# Patient Record
Sex: FEMALE | Race: BLACK OR AFRICAN AMERICAN | Employment: FULL TIME | URBAN - METROPOLITAN AREA
[De-identification: names, ages, dates, MRNs, and addresses within clinical notes are randomized per-mention and may not be internally consistent; named-entity substitution may affect disease eponyms.]

---

## 2017-06-29 ENCOUNTER — OFFICE VISIT (OUTPATIENT)
Dept: FAMILY MEDICINE CLINIC | Age: 37
End: 2017-06-29

## 2017-06-29 VITALS
TEMPERATURE: 98.2 F | DIASTOLIC BLOOD PRESSURE: 90 MMHG | HEART RATE: 70 BPM | SYSTOLIC BLOOD PRESSURE: 130 MMHG | BODY MASS INDEX: 32.8 KG/M2 | HEIGHT: 67 IN | RESPIRATION RATE: 16 BRPM | WEIGHT: 209 LBS | OXYGEN SATURATION: 96 %

## 2017-06-29 DIAGNOSIS — J34.89 RHINORRHEA: ICD-10-CM

## 2017-06-29 DIAGNOSIS — M54.6 CHRONIC MIDLINE THORACIC BACK PAIN: Primary | ICD-10-CM

## 2017-06-29 DIAGNOSIS — L98.9 SKIN LESION OF HAND: ICD-10-CM

## 2017-06-29 DIAGNOSIS — G89.29 CHRONIC MIDLINE THORACIC BACK PAIN: Primary | ICD-10-CM

## 2017-06-29 RX ORDER — CHOLECALCIFEROL TAB 125 MCG (5000 UNIT) 125 MCG
TAB ORAL DAILY
COMMUNITY
End: 2019-01-07

## 2017-06-29 RX ORDER — CYCLOBENZAPRINE HCL 10 MG
TABLET ORAL
COMMUNITY
End: 2017-08-07

## 2017-06-29 RX ORDER — ATORVASTATIN CALCIUM 20 MG/1
20 TABLET, FILM COATED ORAL DAILY
COMMUNITY
End: 2017-09-13 | Stop reason: SDUPTHER

## 2017-06-29 NOTE — PROGRESS NOTES
HISTORY OF PRESENT ILLNESS  Samantha Sosa is a 44 y.o. female. HPI   Pt is new to the practice. Patient is here today for evaluation and treatment of: Back Pain / Headache / Skin Problem    Back Pain:   Pt has had some back pain; Pain is localized to the center mid back;; may radiate to sides. No injuries to back. Aleve may have helped some;  Pain comes and goes. She is approximately a 44 HH bra size; She wonders if this is contributory. She is not interested in breast reduction surgery at this time. Headache: awakened last night with a throbbing Headache. Took an aleve and sx got better . No change in vision. LMP 6/24/2017 . Had a similar HA 2 nights before. Skin Problem: has a bump on her right hand. Has been present X 1 month; No itch; No pain; No drainage; no weeping. Has rhinnorhea; may take a benadryl at times. No sneezing; No itchy or watery eyes ; No nasal congestion; No fever. BP is slightly elevated today. Had BP check before  giving blood last Thursday and BP was stable. Plans to start an exercise regimen. Current Outpatient Prescriptions:     cholecalciferol, VITAMIN D3, (VITAMIN D3) 5,000 unit tab tablet, Take  by mouth daily. , Disp: , Rfl:     atorvastatin (LIPITOR) 20 mg tablet, Take 20 mg by mouth daily. , Disp: , Rfl:     cyclobenzaprine (FLEXERIL) 10 mg tablet, Take  by mouth three (3) times daily as needed for Muscle Spasm(s). , Disp: , Rfl:       PMH,  Meds, Allergies, Family History, Social history reviewed    Review of Systems   Constitutional: Negative for chills, fever and malaise/fatigue. Cardiovascular: Negative for chest pain and palpitations. Musculoskeletal: Positive for back pain. Negative for joint pain. Skin: Negative for rash. Physical Exam   Constitutional: She is oriented to person, place, and time. She appears well-developed and well-nourished. No distress. HENT:   Head: Normocephalic and atraumatic.    Right Ear: Tympanic membrane normal.   Left Ear: Tympanic membrane normal.   Nose: No mucosal edema or rhinorrhea. Mouth/Throat: No oropharyngeal exudate. Neck: Neck supple. No thyromegaly present. Cardiovascular: Normal rate and regular rhythm. Exam reveals no gallop and no friction rub. No murmur heard. Pulmonary/Chest: Breath sounds normal. No respiratory distress. She has no wheezes. She has no rales. Musculoskeletal: She exhibits tenderness (in mid central back). She exhibits no edema. Neurological: She is alert and oriented to person, place, and time. No cranial nerve deficit. She exhibits normal muscle tone. Coordination normal.   Nursing note and vitals reviewed. right dorsal hand with a pinpoint flesh tone papule  Visit Vitals    BP (!) 134/96 (BP 1 Location: Right arm, BP Patient Position: Sitting)    Pulse 70    Temp 98.2 °F (36.8 °C) (Oral)    Resp 16    Ht 5' 7\" (1.702 m)    Wt 209 lb (94.8 kg)    LMP 06/23/2017    SpO2 96%    BMI 32.73 kg/m2         ASSESSMENT and PLAN    ICD-10-CM ICD-9-CM    1. Chronic midline thoracic back pain- new likely mechanical M54.6 724.1 REFERRAL TO PHYSICAL THERAPY    G89.29 338.29    2. Skin lesion of hand- ? wart L98.9 709.9    3. Rhinorrhea - probable allergic rhinitis J34.89 478.19        As above, all new to this provider   treatment plan as listed below  Orders Placed This Encounter    REFERRAL TO PHYSICAL THERAPY    cholecalciferol, VITAMIN D3, (VITAMIN D3) 5,000 unit tab tablet    atorvastatin (LIPITOR) 20 mg tablet    cyclobenzaprine (FLEXERIL) 10 mg tablet     meds reconciled  PT consult  Advised OTC claritin for rhinnorhea  Consider salicylic acid to lesion on hand; ? Wart  Follow-up Disposition:  Return in about 8 weeks (around 8/24/2017) for back pain. An After Visit Summary was printed and given to the patient. This has been fully explained to the patient, who indicates understanding.   Upper back pain exercises given  Monitor BP; exercise regimen will likely help BP control

## 2017-06-29 NOTE — PATIENT INSTRUCTIONS
Healthy Upper Back: Exercises  Your Care Instructions  Here are some examples of exercises for your upper back. Start each exercise slowly. Ease off the exercise if you start to have pain. Your doctor or physical therapist will tell you when you can start these exercises and which ones will work best for you. How to do the exercises  Lower neck and upper back stretch    1. Stretch your arms out in front of your body. Clasp one hand on top of your other hand. 2. Gently reach out so that you feel your shoulder blades stretching away from each other. 3. Gently bend your head forward. 4. Hold for 15 to 30 seconds. 5. Repeat 2 to 4 times. Midback stretch    Note: If you have knee pain, do not do this exercise. 1. Kneel on the floor, and sit back on your ankles. 2. Lean forward, place your hands on the floor, and stretch your arms out in front of you. Rest your head between your arms. 3. Gently push your chest toward the floor, reaching as far in front of you as possible. 4. Hold for 15 to 30 seconds. 5. Repeat 2 to 4 times. Shoulder rolls    1. Sit comfortably with your feet shoulder-width apart. You can also do this exercise while standing. 2. Roll your shoulders up, then back, and then down in a smooth, circular motion. 3. Repeat 2 to 4 times. Wall push-up    1. Stand against a wall with your feet about 12 to 24 inches back from the wall. If you feel any pain when you do this exercise, stand closer to the wall. 2. Place your hands on the wall slightly wider apart than your shoulders, and lean forward. 3. Gently lean your body toward the wall. Then push back to your starting position. Keep the motion smooth and controlled. 4. Repeat 8 to 12 times. Resisted shoulder blade squeeze    Note: For this exercise, you will need elastic exercise material, such as surgical tubing or Thera-Band. 1. Sit or stand, holding the band in both hands in front of you.  Keep your elbows close to your sides, bent at a 90-degree angle. Your palms should face up. 2. Squeeze your shoulder blades together, and move your arms to the outside, stretching the band. Be sure to keep your elbows at your sides while you do this. 3. Relax. 4. Repeat 8 to 12 times. Resisted rows    Note: For this exercise, you will need elastic exercise material, such as surgical tubing or Thera-Band. 1. Put the band around a solid object, such as a bedpost, at about waist level. Hold one end of the band in each hand. 2. With your elbows at your sides and bent to 90 degrees, pull the band back to move your shoulder blades toward each other. Return to the starting position. 3. Repeat 8 to 12 times. Follow-up care is a key part of your treatment and safety. Be sure to make and go to all appointments, and call your doctor if you are having problems. It's also a good idea to know your test results and keep a list of the medicines you take. Where can you learn more? Go to http://genaro-renu.info/. Enter N176 in the search box to learn more about \"Healthy Upper Back: Exercises. \"  Current as of: March 21, 2017  Content Version: 11.3  © 9286-9176 [employer]. Care instructions adapted under license by [employer] (which disclaims liability or warranty for this information). If you have questions about a medical condition or this instruction, always ask your healthcare professional. [place] [address] any warranty or liability for your use of this information.

## 2017-06-29 NOTE — MR AVS SNAPSHOT
Visit Information Date & Time Provider Department Dept. Phone Encounter #  
 6/29/2017  3:20 PM Edison Velasquez, [address] [id number] Follow-up Instructions Return in about 8 weeks (around 8/24/2017) for back pain. Upcoming Health Maintenance Date Due DTaP/Tdap/Td series (1 - Tdap) 2/21/2000 PAP AKA CERVICAL CYTOLOGY 2/21/2000 INFLUENZA AGE 9 TO ADULT 8/1/2017 Allergies as of 6/29/2017  Review Complete On: 6/29/2017 By: Jenn Saldivar LPN No Known Allergies Current Immunizations  Never Reviewed No immunizations on file. Not reviewed this visit You Were Diagnosed With   
  
 Codes Comments Chronic midline thoracic back pain    -  Primary ICD-10-CM: M54.6, G89.29 ICD-9-CM: 724.1, 338.29 Skin lesion of hand     ICD-10-CM: L98.9 ICD-9-CM: 709.9 Rhinorrhea     ICD-10-CM: J34.89 ICD-9-CM: 478.19 Vitals BP Pulse Temp Resp Height(growth percentile) Weight(growth percentile) 130/90 70 98.2 °F (36.8 °C) (Oral) 16 5' 7\" (1.702 m) 209 lb (94.8 kg) LMP SpO2 BMI OB Status Smoking Status 06/23/2017 96% 32.73 kg/m2 Unknown Never Smoker Vitals History BMI and BSA Data Body Mass Index Body Surface Area 32.73 kg/m 2 2.12 m 2 Your Updated Medication List  
  
   
This list is accurate as of: 6/29/17  4:13 PM.  Always use your most recent med list.  
  
  
  
  
 atorvastatin 20 mg tablet Commonly known as:  LIPITOR Take 20 mg by mouth daily. cyclobenzaprine 10 mg tablet Commonly known as:  FLEXERIL Take  by mouth three (3) times daily as needed for Muscle Spasm(s). VITAMIN D3 5,000 unit Tab tablet Generic drug:  cholecalciferol (VITAMIN D3) Take  by mouth daily. We Performed the Following REFERRAL TO PHYSICAL THERAPY [SZD29 Custom] Comments:  
 Please evaluate patient for thoracic back pain. Please schedule and authorize patient for services per therapists' discretion Follow-up Instructions Return in about 8 weeks (around 8/24/2017) for back pain. Referral Information Referral ID Referred By Referred To  
  
 [id number] Octavio Dhillon Not Available Visits Status Start Date End Date 1 New Request 6/29/17 6/29/18 If your referral has a status of pending review or denied, additional information will be sent to support the outcome of this decision. Patient Instructions Healthy Upper Back: Exercises Your Care Instructions Here are some examples of exercises for your upper back. Start each exercise slowly. Ease off the exercise if you start to have pain. Your doctor or physical therapist will tell you when you can start these exercises and which ones will work best for you. How to do the exercises Lower neck and upper back stretch 1. Stretch your arms out in front of your body. Clasp one hand on top of your other hand. 2. Gently reach out so that you feel your shoulder blades stretching away from each other. 3. Gently bend your head forward. 4. Hold for 15 to 30 seconds. 5. Repeat 2 to 4 times. Midback stretch Note: If you have knee pain, do not do this exercise. 1. Kneel on the floor, and sit back on your ankles. 2. Lean forward, place your hands on the floor, and stretch your arms out in front of you. Rest your head between your arms. 3. Gently push your chest toward the floor, reaching as far in front of you as possible. 4. Hold for 15 to 30 seconds. 5. Repeat 2 to 4 times. Shoulder rolls 1. Sit comfortably with your feet shoulder-width apart. You can also do this exercise while standing. 2. Roll your shoulders up, then back, and then down in a smooth, circular motion. 3. Repeat 2 to 4 times. Wall push-up 1.  Stand against a wall with your feet about 12 to 24 inches back from the wall. If you feel any pain when you do this exercise, stand closer to the wall. 2. Place your hands on the wall slightly wider apart than your shoulders, and lean forward. 3. Gently lean your body toward the wall. Then push back to your starting position. Keep the motion smooth and controlled. 4. Repeat 8 to 12 times. Resisted shoulder blade squeeze Note: For this exercise, you will need elastic exercise material, such as surgical tubing or Thera-Band. 1. Sit or stand, holding the band in both hands in front of you. Keep your elbows close to your sides, bent at a 90-degree angle. Your palms should face up. 2. Squeeze your shoulder blades together, and move your arms to the outside, stretching the band. Be sure to keep your elbows at your sides while you do this. 3. Relax. 4. Repeat 8 to 12 times. Resisted rows Note: For this exercise, you will need elastic exercise material, such as surgical tubing or Thera-Band. 1. Put the band around a solid object, such as a bedpost, at about waist level. Hold one end of the band in each hand. 2. With your elbows at your sides and bent to 90 degrees, pull the band back to move your shoulder blades toward each other. Return to the starting position. 3. Repeat 8 to 12 times. Follow-up care is a key part of your treatment and safety. Be sure to make and go to all appointments, and call your doctor if you are having problems. It's also a good idea to know your test results and keep a list of the medicines you take. Where can you learn more? Go to http://genaro-renu.info/. Enter C798 in the search box to learn more about \"Healthy Upper Back: Exercises. \" Current as of: March 21, 2017 Content Version: 11.3 © 0636-7232 [employer]. Care instructions adapted under license by [employer] (which disclaims liability or warranty for this information).  If you have questions about a medical condition or this instruction, always ask your healthcare professional. [facility] [address] any warranty or liability for your use of this information. Introducing [facility]! Edgard Deleon introduces [employer] patient portal. Now you can access parts of your medical record, email your doctor's office, and request medication refills online. 1. In your internet browser, go to [email or web address] 2. Click on the First Time User? Click Here link in the Sign In box. You will see the New Member Sign Up page. 3. Enter your [employer] Access Code exactly as it appears below. You will not need to use this code after youve completed the sign-up process. If you do not sign up before the expiration date, you must request a new code. · [employer] Access Code: [email or web address] Expires: 9/27/2017  4:12 PM 
 
4. Enter the last four digits of your Social Security Number (xxxx) and Date of Birth (mm/dd/yyyy) as indicated and click Submit. You will be taken to the next sign-up page. 5. Create a [employer] ID. This will be your [employer] login ID and cannot be changed, so think of one that is secure and easy to remember. 6. Create a [employer] password. You can change your password at any time. 7. Enter your Password Reset Question and Answer. This can be used at a later time if you forget your password. 8. Enter your e-mail address. You will receive e-mail notification when new information is available in [address] 9. Click Sign Up. You can now view and download portions of your medical record. 10. Click the Download Summary menu link to download a portable copy of your medical information. If you have questions, please visit the Frequently Asked Questions section of the [employer] website. Remember, [employer] is NOT to be used for urgent needs. For medical emergencies, dial 911. Now available from your iPhone and Android! Please provide this summary of care documentation to your next provider. If you have any questions after today's visit, please call [phone].

## 2017-08-07 ENCOUNTER — HOSPITAL ENCOUNTER (EMERGENCY)
Age: 37
Discharge: HOME | End: 2017-08-07
Attending: EMERGENCY MEDICINE
Payer: COMMERCIAL

## 2017-08-07 VITALS
WEIGHT: 212 LBS | BODY MASS INDEX: 33.27 KG/M2 | SYSTOLIC BLOOD PRESSURE: 157 MMHG | HEART RATE: 73 BPM | RESPIRATION RATE: 18 BRPM | HEIGHT: 67 IN | OXYGEN SATURATION: 100 % | TEMPERATURE: 99.1 F | DIASTOLIC BLOOD PRESSURE: 95 MMHG

## 2017-08-07 DIAGNOSIS — M54.6 CHRONIC THORACIC BACK PAIN, UNSPECIFIED BACK PAIN LATERALITY: Primary | ICD-10-CM

## 2017-08-07 DIAGNOSIS — G89.29 CHRONIC THORACIC BACK PAIN, UNSPECIFIED BACK PAIN LATERALITY: Primary | ICD-10-CM

## 2017-08-07 DIAGNOSIS — R03.0 ELEVATED BLOOD PRESSURE READING: ICD-10-CM

## 2017-08-07 LAB — TROPONIN I BLD-MCNC: <0.04 NG/ML (ref 0–0.08)

## 2017-08-07 PROCEDURE — 84484 ASSAY OF TROPONIN QUANT: CPT

## 2017-08-07 PROCEDURE — 93005 ELECTROCARDIOGRAM TRACING: CPT

## 2017-08-07 PROCEDURE — 99283 EMERGENCY DEPT VISIT LOW MDM: CPT

## 2017-08-07 NOTE — ED TRIAGE NOTES
Pt. Complains of back pain on and off, she reports the back pain ongoing since the beginning of the year, she reports seeing her primary care since then. Pt denies any trauma.

## 2017-08-07 NOTE — ED PROVIDER NOTES
HPI Comments: 7:23 PM   44 y.o. female presents to ED C/O back pain. Patient has a HX of hypercholesteremia. Patient reports 6 months of intermittent back pain. patient states intermittent back pain, starts in the middle of her desk and the pain intermittently radiates side to side. Patient has been seen by PCP for same compliant in the past and she is following up with PT tomorrow as referred by PCP. Patient's PCP sent her home with prednisone and flexeril, patient did not take flexeril often. Patient reports left arm was aching, felt like she had sprained it and she had some neck pain. Patient concerned cardiac component of pain. Patient reports intermittent aching in the anterior chest, but she reports it may also be her breast that is hurting. Patient is a nonsmoker. LMP 7/21. Patient has no personal cardiac HX, no significant family cardiac HX. Patient denies back pain at this time. Patient denies any other symptoms or complaints. The history is provided by the patient. History limited by: No language barrier. Past Medical History:   Diagnosis Date    Hypercholesteremia     Ill-defined condition     High cholesterol. History reviewed. No pertinent surgical history. Family History:   Problem Relation Age of Onset    Hypertension Mother     Diabetes Maternal Grandmother      sarcoidosis    Prostate Cancer Maternal Grandfather        Social History     Social History    Marital status: SINGLE     Spouse name: N/A    Number of children: N/A    Years of education: N/A     Occupational History          Social History Main Topics    Smoking status: Never Smoker    Smokeless tobacco: Never Used    Alcohol use Yes      Comment: socially    Drug use: No    Sexual activity: No     Other Topics Concern    Not on file     Social History Narrative         ALLERGIES: Review of patient's allergies indicates no known allergies.     Review of Systems Constitutional: Negative for appetite change and fever. HENT: Negative for congestion, rhinorrhea and sore throat. Respiratory: Negative for cough, shortness of breath and wheezing. Cardiovascular: Negative for leg swelling. Gastrointestinal: Negative for abdominal pain, constipation, diarrhea, nausea and vomiting. Genitourinary: Negative for dysuria. Musculoskeletal: Positive for back pain. Negative for arthralgias. Neurological: Negative for dizziness, syncope and headaches. Vitals:    08/07/17 1856 08/07/17 1919   BP: (!) 157/95    Pulse: 73    Resp: 18    Temp: 99.1 °F (37.3 °C)    SpO2: 100% 100%   Weight: 96.2 kg (212 lb)    Height: 5' 7\" (1.702 m)             Physical Exam   Constitutional: She is oriented to person, place, and time. She appears well-developed and well-nourished. No distress. HENT:   Head: Normocephalic and atraumatic. Right Ear: External ear normal.   Left Ear: External ear normal.   Mouth/Throat: Oropharynx is clear and moist.   Eyes: Conjunctivae and EOM are normal.   Cardiovascular: Normal rate, regular rhythm and normal heart sounds. Pulmonary/Chest: Effort normal and breath sounds normal. No respiratory distress. She has no wheezes. She has no rales. She exhibits no tenderness. Musculoskeletal: Normal range of motion. Thoracic back: She exhibits normal range of motion, no tenderness, no bony tenderness, no swelling, no pain and normal pulse. Back:    Neurological: She is alert and oriented to person, place, and time. Coordination normal.   Skin: Skin is warm and dry. No rash noted. She is not diaphoretic. No erythema. No pallor. Nursing note and vitals reviewed.        MDM  Number of Diagnoses or Management Options  Chronic thoracic back pain, unspecified back pain laterality:   Elevated blood pressure reading:   Diagnosis management comments: Differential Diagnosis: Musculoskeletal pain, myofascial strain/sprain, muscle spasm, spondylolisthesis, spondylosis, DJD, OA, sciatica, aortic aneurysm, vertebral infection, renal colic, pyelonephritis, epidural abscess, epidural hematoma, herniated nucleus pulposis, malignancy    Plan:  EKG and Trop, low clinical concern for cardiac component, however due to age, and complaint of right arm involvement 3 days ago will get trop and EKG. Patient has no trauma and no pain at this time, no indication for imagining   Progress - EKG- Normal sinus rhythm Normal ECG No previous ECGs available, negative Trop  -Patient informed of results, educated to take home pain medication as needed. Patient referred to PCP for further evaluation, and orthopedics, also educated to keep PT appointment tomorrow for chronic thoracic back pain , referred by PCP. Patient educated to return to the ED for any new or worsening symptoms. Questions denied. Amount and/or Complexity of Data Reviewed  Clinical lab tests: ordered and reviewed      ED Course       Procedures             RESULTS:    No orders to display       Labs Reviewed   POC TROPONIN-I       Recent Results (from the past 12 hour(s))   EKG, 12 LEAD, INITIAL    Collection Time: 08/07/17  7:44 PM   Result Value Ref Range    Ventricular Rate 70 BPM    Atrial Rate 70 BPM    P-R Interval 164 ms    QRS Duration 82 ms    Q-T Interval 408 ms    QTC Calculation (Bezet) 440 ms    Calculated P Axis 41 degrees    Calculated R Axis 12 degrees    Calculated T Axis 34 degrees    Diagnosis       Normal sinus rhythm  Normal ECG  No previous ECGs available     POC TROPONIN-I    Collection Time: 08/07/17  8:00 PM   Result Value Ref Range    Troponin-I (POC) <0.04 0.00 - 0.08 ng/mL       PROGRESS NOTE:   7:23 PM   Initial assessment completed. Written by Roshan Clemente NP-C    One or more blood pressure readings were noted elevated during the Pt's presentation in the emergency department this date.   This abnormal reading has been cited in the Pt's diagnosis, and they have been encouraged to follow up with their primary care physician, or referred to a consultant for further evaluation and treatment. DISCHARGE NOTE:  9:07 PM   Jamila Delong's  results have been reviewed with her. She has been counseled regarding her diagnosis, treatment, and plan. She verbally conveys understanding and agreement of the signs, symptoms, diagnosis, treatment and prognosis and additionally agrees to follow up as discussed. She also agrees with the care-plan and conveys that all of her questions have been answered. I have also provided discharge instructions for her that include: educational information regarding their diagnosis and treatment, and list of reasons why they would want to return to the ED prior to their follow-up appointment, should her condition change. CLINICAL IMPRESSION:    1. Chronic thoracic back pain, unspecified back pain laterality    2.  Elevated blood pressure reading        AFTER VISIT PLAN:    Current Discharge Medication List           Follow-up Information     Follow up With Details Comments Contact Info    Anand Mccollum MD Schedule an appointment as soon as possible for a visit in 3 days Further evaluation [address] [facility]  [phone]             Written by Alexandra PATRICK              .

## 2017-08-08 ENCOUNTER — APPOINTMENT (OUTPATIENT)
Dept: PHYSICAL THERAPY | Age: 37
End: 2017-08-08

## 2017-08-08 LAB
ATRIAL RATE: 70 BPM
CALCULATED P AXIS, ECG09: 41 DEGREES
CALCULATED R AXIS, ECG10: 12 DEGREES
CALCULATED T AXIS, ECG11: 34 DEGREES
DIAGNOSIS, 93000: NORMAL
P-R INTERVAL, ECG05: 164 MS
Q-T INTERVAL, ECG07: 408 MS
QRS DURATION, ECG06: 82 MS
QTC CALCULATION (BEZET), ECG08: 440 MS
VENTRICULAR RATE, ECG03: 70 BPM

## 2017-08-08 NOTE — DISCHARGE INSTRUCTIONS
Back Pain: Care Instructions  Your Care Instructions    Back pain has many possible causes. It is often related to problems with muscles and ligaments of the back. It may also be related to problems with the nerves, discs, or bones of the back. Moving, lifting, standing, sitting, or sleeping in an awkward way can strain the back. Sometimes you don't notice the injury until later. Arthritis is another common cause of back pain. Although it may hurt a lot, back pain usually improves on its own within several weeks. Most people recover in 12 weeks or less. Using good home treatment and being careful not to stress your back can help you feel better sooner. Follow-up care is a key part of your treatment and safety. Be sure to make and go to all appointments, and call your doctor if you are having problems. Its also a good idea to know your test results and keep a list of the medicines you take. How can you care for yourself at home? · Sit or lie in positions that are most comfortable and reduce your pain. Try one of these positions when you lie down:  ¨ Lie on your back with your knees bent and supported by large pillows. ¨ Lie on the floor with your legs on the seat of a sofa or chair. Merrick Schiller on your side with your knees and hips bent and a pillow between your legs. ¨ Lie on your stomach if it does not make pain worse. · Do not sit up in bed, and avoid soft couches and twisted positions. Bed rest can help relieve pain at first, but it delays healing. Avoid bed rest after the first day of back pain. · Change positions every 30 minutes. If you must sit for long periods of time, take breaks from sitting. Get up and walk around, or lie in a comfortable position. · Try using a heating pad on a low or medium setting for 15 to 20 minutes every 2 or 3 hours. Try a warm shower in place of one session with the heating pad. · You can also try an ice pack for 10 to 15 minutes every 2 to 3 hours.  Put a thin cloth between the ice pack and your skin. · Take pain medicines exactly as directed. ¨ If the doctor gave you a prescription medicine for pain, take it as prescribed. ¨ If you are not taking a prescription pain medicine, ask your doctor if you can take an over-the-counter medicine. · Take short walks several times a day. You can start with 5 to 10 minutes, 3 or 4 times a day, and work up to longer walks. Walk on level surfaces and avoid hills and stairs until your back is better. · Return to work and other activities as soon as you can. Continued rest without activity is usually not good for your back. · To prevent future back pain, do exercises to stretch and strengthen your back and stomach. Learn how to use good posture, safe lifting techniques, and proper body mechanics. When should you call for help? Call your doctor now or seek immediate medical care if:  · You have new or worsening numbness in your legs. · You have new or worsening weakness in your legs. (This could make it hard to stand up.)  · You lose control of your bladder or bowels. Watch closely for changes in your health, and be sure to contact your doctor if:  · Your pain gets worse. · You are not getting better after 2 weeks. Where can you learn more? Go to http://genaro-renu.info/. Enter U901 in the search box to learn more about \"Back Pain: Care Instructions. \"  Current as of: March 21, 2017  Content Version: 11.3  © 5866-3715 [employer]ift[employer]. Care instructions adapted under license by Animated [employer] (which disclaims liability or warranty for this information). If you have questions about a medical condition or this instruction, always ask your healthcare professional. [address] any warranty or liability for your use of this information. Learning About Relief for Back Pain  What is back tension and strain?     Back strain happens when you overstretch, or pull, a muscle in your back. You may hurt your back in an accident or when you exercise or lift something. Most back pain will get better with rest and time. You can take care of yourself at home to help your back heal.  What can you do first to relieve back pain? When you first feel back pain, try these steps:  · Walk. Take a short walk (10 to 20 minutes) on a level surface (no slopes, hills, or stairs) every 2 to 3 hours. Walk only distances you can manage without pain, especially leg pain. · Relax. Find a comfortable position for rest. Some people are comfortable on the floor or a medium-firm bed with a small pillow under their head and another under their knees. Some people prefer to lie on their side with a pillow between their knees. Don't stay in one position for too long. · Try heat or ice. Try using a heating pad on a low or medium setting, or take a warm shower, for 15 to 20 minutes every 2 to 3 hours. Or you can buy single-use heat wraps that last up to 8 hours. You can also try an ice pack for 10 to 15 minutes every 2 to 3 hours. You can use an ice pack or a bag of frozen vegetables wrapped in a thin towel. There is not strong evidence that either heat or ice will help, but you can try them to see if they help. You may also want to try switching between heat and cold. · Take pain medicine exactly as directed. ¨ If the doctor gave you a prescription medicine for pain, take it as prescribed. ¨ If you are not taking a prescription pain medicine, ask your doctor if you can take an over-the-counter medicine. What else can you do? · Stretch and exercise. Exercises that increase flexibility may relieve your pain and make it easier for your muscles to keep your spine in a good, neutral position. And don't forget to keep walking. · Do self-massage. You can use self-massage to unwind after work or school or to energize yourself in the morning. You can easily massage your feet, hands, or neck.  Self-massage works best if you are in comfortable clothes and are sitting or lying in a comfortable position. Use oil or lotion to massage bare skin. · Reduce stress. Back pain can lead to a vicious [place]: Distress about the pain tenses the muscles in your back, which in turn causes more pain. Learn how to relax your mind and your muscles to lower your stress. Where can you learn more? Go to http://genaro-renu.info/. Enter Q009 in the search box to learn more about \"Learning About Relief for Back Pain. \"  Current as of: 2017  Content Version: 11.3  © 9614-1879 [employer]. Care instructions adapted under license by [employer] (which disclaims liability or warranty for this information). If you have questions about a medical condition or this instruction, always ask your healthcare professional. [address] any warranty or liability for your use of this information. [employer] Activation    Thank you for requesting access to [employer] Please follow the instructions below to securely access and download your online medical record. [employer] allows you to send messages to your doctor, view your test results, renew your prescriptions, schedule appointments, and more. How Do I Sign Up? 1. In your internet browser, go to [email or web address]  2. Click on the First Time User? Click Here link in the Sign In box. You will be redirect to the New Member Sign Up page. 3. Enter your [employer] Access Code exactly as it appears below. You will not need to use this code after youve completed the sign-up process. If you do not sign up before the expiration date, you must request a new code. [employer] Access Code: [email or web address]  Expires: 2017  4:12 PM (This is the date your [employer] access code will )    4. Enter the last four digits of your Social Security Number (xxxx) and Date of Birth (mm/dd/yyyy) as indicated and click Submit.  You will be taken to the next sign-up page. 5. Create a Harimatat ID. This will be your [employer]ebaTech login ID and cannot be changed, so think of one that is secure and easy to remember. 6. Create a [employer]Tech password. You can change your password at any time. 7. Enter your Password Reset Question and Answer. This can be used at a later time if you forget your password. 8. Enter your e-mail address. You will receive e-mail notification when new information is available in [address]. 9. Click Sign Up. You can now view and download portions of your medical record. 10. Click the Download Summary menu link to download a portable copy of your medical information. Additional Information    If you have questions, please visit the Frequently Asked Questions section of the [employer] website at [email or web address] Remember, [employer] is NOT to be used for urgent needs. For medical emergencies, dial 911.

## 2017-08-09 ENCOUNTER — OFFICE VISIT (OUTPATIENT)
Dept: FAMILY MEDICINE CLINIC | Age: 37
End: 2017-08-09

## 2017-08-09 ENCOUNTER — HOSPITAL ENCOUNTER (OUTPATIENT)
Dept: LAB | Age: 37
Discharge: HOME | End: 2017-08-09
Payer: COMMERCIAL

## 2017-08-09 VITALS
RESPIRATION RATE: 18 BRPM | TEMPERATURE: 98.5 F | OXYGEN SATURATION: 98 % | DIASTOLIC BLOOD PRESSURE: 86 MMHG | HEIGHT: 67 IN | HEART RATE: 89 BPM | WEIGHT: 213 LBS | SYSTOLIC BLOOD PRESSURE: 118 MMHG | BODY MASS INDEX: 33.43 KG/M2

## 2017-08-09 DIAGNOSIS — R42 DIZZINESS: ICD-10-CM

## 2017-08-09 DIAGNOSIS — M62.830 MUSCLE SPASM OF BACK: Primary | ICD-10-CM

## 2017-08-09 LAB
ALBUMIN SERPL BCP-MCNC: 3.7 G/DL (ref 3.4–5)
ALBUMIN/GLOB SERPL: 1.1 {RATIO} (ref 0.8–1.7)
ALP SERPL-CCNC: 60 U/L (ref 45–117)
ALT SERPL-CCNC: 17 U/L (ref 13–56)
ANION GAP BLD CALC-SCNC: 7 MMOL/L (ref 3–18)
AST SERPL W P-5'-P-CCNC: 12 U/L (ref 15–37)
BILIRUB SERPL-MCNC: 0.3 MG/DL (ref 0.2–1)
BUN SERPL-MCNC: 9 MG/DL (ref 7–18)
BUN/CREAT SERPL: 12 (ref 12–20)
CALCIUM SERPL-MCNC: 9 MG/DL (ref 8.5–10.1)
CHLORIDE SERPL-SCNC: 106 MMOL/L (ref 100–108)
CO2 SERPL-SCNC: 26 MMOL/L (ref 21–32)
CREAT SERPL-MCNC: 0.78 MG/DL (ref 0.6–1.3)
GLOBULIN SER CALC-MCNC: 3.4 G/DL (ref 2–4)
GLUCOSE SERPL-MCNC: 87 MG/DL (ref 74–99)
HCT VFR BLD AUTO: 37.1 % (ref 35–45)
HGB BLD-MCNC: 12.3 G/DL (ref 12–16)
POTASSIUM SERPL-SCNC: 4.4 MMOL/L (ref 3.5–5.5)
PROT SERPL-MCNC: 7.1 G/DL (ref 6.4–8.2)
SODIUM SERPL-SCNC: 139 MMOL/L (ref 136–145)
TSH SERPL DL<=0.05 MIU/L-ACNC: 0.91 UIU/ML (ref 0.36–3.74)

## 2017-08-09 PROCEDURE — 80053 COMPREHEN METABOLIC PANEL: CPT | Performed by: NURSE PRACTITIONER

## 2017-08-09 PROCEDURE — 84443 ASSAY THYROID STIM HORMONE: CPT | Performed by: NURSE PRACTITIONER

## 2017-08-09 PROCEDURE — 85018 HEMOGLOBIN: CPT | Performed by: NURSE PRACTITIONER

## 2017-08-09 RX ORDER — ORPHENADRINE CITRATE 100 MG/1
100 TABLET, EXTENDED RELEASE ORAL 2 TIMES DAILY
Qty: 30 TAB | Refills: 0 | Status: SHIPPED | OUTPATIENT
Start: 2017-08-09 | End: 2017-09-19 | Stop reason: SDUPTHER

## 2017-08-09 RX ORDER — NAPROXEN SODIUM 550 MG/1
550 TABLET ORAL 2 TIMES DAILY WITH MEALS
Qty: 30 TAB | Refills: 0 | Status: SHIPPED | OUTPATIENT
Start: 2017-08-09 | End: 2017-09-19 | Stop reason: SDUPTHER

## 2017-08-09 RX ORDER — CYCLOBENZAPRINE HCL 10 MG
TABLET ORAL
COMMUNITY
Start: 2017-06-05 | End: 2017-08-09 | Stop reason: ALTCHOICE

## 2017-08-09 NOTE — LETTER
8/10/2017 4:35 PM 
 
Ms. Memo Sanchez [address] [address] Dear Memo Sanchez: 
 
Please find your most recent results below. Resulted Orders HGB & HCT Result Value Ref Range HGB 12.3 12.0 - 16.0 g/dL HCT 37.1 35.0 - 45.0 % METABOLIC PANEL, COMPREHENSIVE Result Value Ref Range Sodium 139 136 - 145 mmol/L Potassium 4.4 3.5 - 5.5 mmol/L Chloride 106 100 - 108 mmol/L  
 CO2 26 21 - 32 mmol/L Anion gap 7 3.0 - 18 mmol/L Glucose 87 74 - 99 mg/dL BUN 9 7.0 - 18 MG/DL Creatinine 0.78 0.6 - 1.3 MG/DL  
 BUN/Creatinine ratio 12 12 - 20 GFR est AA >60 >60 ml/min/1.73m2 GFR est non-AA >60 >60 ml/min/1.73m2 Comment:  
   (NOTE) Estimated GFR is calculated using the Modification of Diet in Renal  
Disease (MDRD) Study equation, reported for both  [facility]) and non- Americans (GFRNA), and normalized to 1.73m2  
body surface area. The physician must decide which value applies to  
the patient. The MDRD study equation should only be used in  
individuals age 25 or older. It has not been validated for the  
following: pregnant women, patients with serious comorbid conditions,  
or on certain medications, or persons with extremes of body size,  
muscle mass, or nutritional status. Calcium 9.0 8.5 - 10.1 MG/DL Bilirubin, total 0.3 0.2 - 1.0 MG/DL  
 ALT (SGPT) 17 13 - 56 U/L  
 AST (SGOT) 12 (L) 15 - 37 U/L Alk. phosphatase 60 45 - 117 U/L Protein, total 7.1 6.4 - 8.2 g/dL Albumin 3.7 3.4 - 5.0 g/dL Globulin 3.4 2.0 - 4.0 g/dL A-G Ratio 1.1 0.8 - 1.7 TSH 3RD GENERATION Result Value Ref Range TSH 0.91 0.36 - 3.74 uIU/mL RECOMMENDATIONS: 
Your labs are within normal range. We will continue to monitor. Please call me if you have any questions: [phone] Sincerely, Anupama Kohli NP

## 2017-08-09 NOTE — PROGRESS NOTES
HISTORY OF PRESENT ILLNESS  Cathy Beebe is a 44 y.o. female.   HPI    ROS    Physical Exam    ASSESSMENT and PLAN  {ASSESSMENT/PLAN:29167}

## 2017-08-09 NOTE — MR AVS SNAPSHOT
Visit Information Date & Time Provider Department Dept. Phone Encounter #  
 8/9/2017  9:45 AM Danyel Rosario NP On license of [facility] [phone] [id number] Upcoming Health Maintenance Date Due DTaP/Tdap/Td series (1 - Tdap) 2/21/2000 PAP AKA CERVICAL CYTOLOGY 2/21/2000 INFLUENZA AGE 9 TO ADULT 8/1/2017 Allergies as of 8/9/2017  Review Complete On: 8/9/2017 By: Danyel Rosario NP No Known Allergies Current Immunizations  Never Reviewed No immunizations on file. Not reviewed this visit You Were Diagnosed With   
  
 Codes Comments Muscle spasm of back    -  Primary ICD-10-CM: U89.745 ICD-9-CM: 724.8 Dizziness     ICD-10-CM: S07 ICD-9-CM: 780.4 Vitals BP Pulse Temp Resp Height(growth percentile) Weight(growth percentile) 118/86 (BP 1 Location: Right arm, BP Patient Position: Sitting) 89 98.5 °F (36.9 °C) (Oral) 18 5' 7\" (1.702 m) 213 lb (96.6 kg) LMP SpO2 BMI OB Status Smoking Status 07/20/2017 98% 33.36 kg/m2 Having regular periods Never Smoker Vitals History BMI and BSA Data Body Mass Index Body Surface Area  
 33.36 kg/m 2 2.14 m 2 Preferred Pharmacy Pharmacy Name Phone Elie Hagan E [facility], [address] [place] [address] Road [phone] Your Updated Medication List  
  
   
This list is accurate as of: 8/9/17 10:15 AM.  Always use your most recent med list.  
  
  
  
  
 atorvastatin 20 mg tablet Commonly known as:  LIPITOR Take 20 mg by mouth daily. naproxen sodium 550 mg tablet Commonly known as:  Hoang Counts Take 1 Tab by mouth two (2) times daily (with meals). orphenadrine citrate 100 mg sr tablet Commonly known as:  NORFLEX Take 1 Tab by mouth two (2) times a day. GURPREET PO Take 1 Tab by mouth daily. VITAMIN D3 5,000 unit Tab tablet Generic drug:  cholecalciferol (VITAMIN D3) Take  by mouth daily. Prescriptions Sent to Pharmacy Refills  
 naproxen sodium (ANAPROX) 550 mg tablet 0 Sig: Take 1 Tab by mouth two (2) times daily (with meals). Class: Normal  
 Pharmacy: [facility] [address], [address] [place] [address] Road Ph #: [phone] Route: Oral  
 orphenadrine citrate (NORFLEX) 100 mg sr tablet 0 Sig: Take 1 Tab by mouth two (2) times a day. Class: Normal  
 Pharmacy: [facility] [address], [address] [place] [address] Ph #: [phone] Route: Oral  
  
To-Do List   
 08/09/2017 Lab:  HGB & HCT   
  
 08/09/2017 Lab:  METABOLIC PANEL, COMPREHENSIVE   
  
 08/09/2017 Lab:  TSH 3RD GENERATION   
  
 08/16/2017 2:00 PM  
  Appointment with Ralf Spears PT at [facility]  [facility] [address] ([phone]) Introducing [facility]! [facility] introduces exoro system patient portal. Now you can access parts of your medical record, email your doctor's office, and request medication refills online. 1. In your internet browser, go to [email or web address] 2. Click on the First Time User? Click Here link in the Sign In box. You will see the New Member Sign Up page. 3. Enter your exoro system Access Code exactly as it appears below. You will not need to use this code after youve completed the sign-up process. If you do not sign up before the expiration date, you must request a new code. · exoro system Access Code: [email or web address] Expires: 9/27/2017  4:12 PM 
 
4. Enter the last four digits of your Social Security Number (xxxx) and Date of Birth (mm/dd/yyyy) as indicated and click Submit. You will be taken to the next sign-up page. 5. Create a Waynat ID. This will be your exoro system login ID and cannot be changed, so think of one that is secure and easy to remember. 6. Create a Waynat password. You can change your password at any time. 7. Enter your Password Reset Question and Answer.  This can be used at a later time if you forget your password. 8. Enter your e-mail address. You will receive e-mail notification when new information is available in [address] 9. Click Sign Up. You can now view and download portions of your medical record. 10. Click the Download Summary menu link to download a portable copy of your medical information. If you have questions, please visit the Frequently Asked Questions section of the Lendinero website. Remember, Lendinero is NOT to be used for urgent needs. For medical emergencies, dial 911. Now available from your iPhone and Android! Please provide this summary of care documentation to your next provider. Your primary care clinician is listed as [address]. If you have any questions after today's visit, please call [phone].

## 2017-08-09 NOTE — PROGRESS NOTES
HISTORY OF PRESENT ILLNESS  Maral Hughes is a 44 y.o. female. Pt presents today for follow up ED visit yesterday. She has tightness in her left shoulder area for over a couple of weeks now. She thought it was a strain at first but could not recall any injury. She has tingling down her arm so she went to ED thinking her heart was involved. The work up there was normal. She has taken some flexeril as needed that she was prescribed from another visit at Urgent Care. What concerned her today was last night when she got up during the night to take some medicine, she became light headed and spilled some water. HPI    Review of Systems   Constitutional: Negative. HENT: Negative. Eyes: Negative. Respiratory: Negative. Cardiovascular: Negative. Gastrointestinal: Negative. Musculoskeletal: Positive for joint pain (left shoulder area. ). Skin: Negative. Neurological: Positive for dizziness and tingling. Visit Vitals    /86 (BP 1 Location: Right arm, BP Patient Position: Sitting)    Pulse 89    Temp 98.5 °F (36.9 °C) (Oral)    Resp 18    Ht 5' 7\" (1.702 m)    Wt 213 lb (96.6 kg)    LMP 07/20/2017    SpO2 98%    BMI 33.36 kg/m2       Physical Exam   Constitutional: She is oriented to person, place, and time. She appears well-developed. No distress. HENT:   Head: Normocephalic and atraumatic. Eyes: Conjunctivae and EOM are normal. Pupils are equal, round, and reactive to light. Right eye exhibits no discharge. Left eye exhibits no discharge. No scleral icterus. Neck: Normal range of motion. Neck supple. No thyromegaly present. Cardiovascular: Normal rate, regular rhythm and normal heart sounds. No murmur heard. Pulmonary/Chest: Effort normal and breath sounds normal. No respiratory distress. She has no wheezes. She has no rales. Musculoskeletal: Normal range of motion. She exhibits no edema. Left shoulder: She exhibits tenderness and spasm.  She exhibits normal range of motion, no swelling, no effusion, no crepitus and no pain. Cervical back: She exhibits normal range of motion, no tenderness, no swelling, no pain and no spasm. Lymphadenopathy:     She has no cervical adenopathy. Neurological: She is alert and oriented to person, place, and time. She has normal reflexes. No cranial nerve deficit. She exhibits normal muscle tone. Coordination normal.   Psychiatric: She has a normal mood and affect. ASSESSMENT and PLAN    ICD-10-CM ICD-9-CM    1. Muscle spasm of back M62.830 724.8 orphenadrine citrate (NORFLEX) 100 mg sr tablet   2. Dizziness R42 780.4 HGB & HCT      TSH 3RD GENERATION      METABOLIC PANEL, COMPREHENSIVE      HGB & HCT      TSH 3RD GENERATION      METABOLIC PANEL, COMPREHENSIVE       PLAN:  We discussed her ED visit from yesterday and the normal work up  We discussed her concerns about her heart. Pt advised to make sure she is well hydrated and has eaten. We discussed muscle strain and pinched nerves, pt advised to take the medications I prescribed twice a day with food for 7-10days. Pt is scheduled for PT that Dr Gi Bell had ordered. Pt is to exam how she is sleeping, she may need to adjust her pillows. Pt is to call with any concerns. Pt given after visit summary.

## 2017-08-09 NOTE — PROGRESS NOTES
1. Have you been to the ER, urgent care clinic since your last visit? Hospitalized since your last visit?seen at [facility]     2. Have you seen or consulted any other health care providers outside of the [address] [place] [address] since your last visit? Include any pap smears or colon screening.  No

## 2017-08-10 NOTE — PROGRESS NOTES
Please inform patient that her kidney and liver functions are normal. Her TSH is in the normal range at 0.9  There is no signs of anemia.

## 2017-08-16 ENCOUNTER — HOSPITAL ENCOUNTER (OUTPATIENT)
Dept: PHYSICAL THERAPY | Age: 37
Discharge: HOME | End: 2017-08-16
Attending: FAMILY MEDICINE
Payer: COMMERCIAL

## 2017-08-16 PROCEDURE — 97110 THERAPEUTIC EXERCISES: CPT

## 2017-08-16 PROCEDURE — 97163 PT EVAL HIGH COMPLEX 45 MIN: CPT

## 2017-08-16 NOTE — PROGRESS NOTES
PT DAILY TREATMENT NOTE     Patient Name: Kristina Walden  Date:2017  : 1980  [x]  Patient  Verified  Payor: BLUE CROSS / Plan: Alfonzo Ng [id number] PPO / Product Type: PPO /    In time:12:09  Out time:1:00  Total Treatment Time (min): 51  Visit #: 1 of 4-6    Treatment Area: Back pain [M54.9]    SUBJECTIVE  Pain Level (0-10 scale): 3  Any medication changes, allergies to medications, adverse drug reactions, diagnosis change, or new procedure performed?: [x] No    [] Yes (see summary sheet for update)  Subjective functional status/changes:   [] No changes reported  \"its not as bad now but it hurts sometimes when I go to get up from the couch\"    OBJECTIVE    26 min [x]Eval                  []Re-Eval       25 min Therapeutic Exercise:  [] See flow sheet :   Rationale: increase ROM, improve coordination and increase proprioception to improve the patients ability to improve core activation and thoracolumbar mobility            With   [] TE   [] TA   [] neuro   [] other: Patient Education: [x] Review HEP    [] Progressed/Changed HEP based on:   [] positioning   [] body mechanics   [] transfers   [] heat/ice application    [] other:      Other Objective/Functional Measures: limited core stability with OOV interventions    Pt reports some lumbar discomfort mildly in QP ER reachbacks, some delayed R scapular discomfort post QP also     Pain Level (0-10 scale) post treatment: 2    ASSESSMENT/Changes in Function: see POC    Patient will continue to benefit from skilled PT services to modify and progress therapeutic interventions, address functional mobility deficits, address ROM deficits, address strength deficits, analyze and address soft tissue restrictions, analyze and cue movement patterns and analyze and modify body mechanics/ergonomics to attain remaining goals.      []  See Plan of Care  []  See progress note/recertification  []  See Discharge Summary         Progress towards goals / Updated goals:  Short Term Goals: To be accomplished in 1 weeks:  1. Pt will be I and compliant with HEP to promote self management of condition. Long Term Goals: To be accomplished in 2-3 weeks:  1. Pt will report at least 50% improvement in back pain to increase ease of ADL performance. 2. Pt will demonstrate proper SA activation and endurance with QP interventions void of pain for increased core activation. 3. Pt will demonstrate proper PPT form in hook lying to improve lumbopelvic mobility with transfers.     PLAN  []  Upgrade activities as tolerated     [x]  Continue plan of care  []  Update interventions per flow sheet       []  Discharge due to:_  []  Other:_      Beni Suarez DPT 8/16/2017  1:16 PM    Future Appointments  Date Time Provider Laurie Quiles   8/30/2017 11:30 AM Jo Mccann

## 2017-08-16 NOTE — PROGRESS NOTES
[facility]  [address]  [place], [address]  [phone] fax    Plan of Care/ Statement of Necessity for Physical Therapy Services    Patient name: Marisela Pittman Start of Care: 2017   Referral source: Tom Sutton MD : 1980    Medical Diagnosis: Chronic midline thoracic back pain [M54.6, G89.29]   Onset Date:8 months    Treatment Diagnosis: Back pain   Prior Hospitalization: see medical history Provider#: [id number]   Medications: Verified on Patient summary List    Comorbidities: high cholesterol   Prior Level of Function: Pt able to perform work duties and daily tasks without back pain. The Plan of Care and following information is based on the information from the initial evaluation. Assessment/ key information: Pt is a 45 y/o F presenting with c/o thoracic and intermittent lumbar back pain. She reports approximately 8 month duration of symptoms with no known JEFF. Pt reports previous L upper arm pain that has since subsided following TPR from MD in office. Unable to clearly provoke patient symptoms at today's examination, aside from reports of mild lumbar pain with QP interventions. Pt is going out of town next week and will try HEP maintenance at that time. Scheduling 1 f/u session at end of month to re-assess and determine need for PT services at that time. Evaluation Complexity History MEDIUM  Complexity : 1-2 comorbidities / personal factors will impact the outcome/ POC ; Examination HIGH Complexity : 4+ Standardized tests and measures addressing body structure, function, activity limitation and / or participation in recreation  ;Presentation MEDIUM Complexity : Evolving with changing characteristics  ; Clinical Decision Making LOW Complexity : FOTO score of   Overall Complexity Rating: HIGH   Problem List: pain affecting function, decrease ROM, decrease strength, decrease flexibility/ joint mobility and decrease transfer abilities   Treatment Plan may include any combination of the following: Therapeutic exercise, Therapeutic activities, Neuromuscular re-education, Physical agent/modality, Gait/balance training, Manual therapy, Patient education, Self Care training and Functional mobility training  Patient / Family readiness to learn indicated by: asking questions and trying to perform skills  Persons(s) to be included in education: patient (P)  Barriers to Learning/Limitations: yes;  other limited provocation of symptoms  Patient Goal (s): Not hurting  Patient Self Reported Health Status: good  Rehabilitation Potential: fair    Short Term Goals: To be accomplished in 1 weeks:  1. Pt will be I and compliant with HEP to promote self management of condition. Long Term Goals: To be accomplished in 2-3 weeks:  1. Pt will report at least 50% improvement in back pain to increase ease of ADL performance. 2. Pt will demonstrate proper SA activation and endurance with QP interventions void of pain for increased core activation. 3. Pt will demonstrate proper PPT form in hook lying to improve lumbopelvic mobility with transfers. Frequency / Duration: Patient to be seen 2 times per week for 2-3 weeks. Patient/ Caregiver education and instruction: Diagnosis, prognosis, self care and exercises   [x]  Plan of care has been reviewed with MADHU Patino DPT 8/16/2017 1:02 PM    ________________________________________________________________________    I certify that the above Therapy Services are being furnished while the patient is under my care. I agree with the treatment plan and certify that this therapy is necessary.     [de-identified] Signature:____________________  Date:____________Time: _________    Please sign and return to [facility] [address]  [place], [address]  [phone] fax

## 2017-08-30 ENCOUNTER — HOSPITAL ENCOUNTER (OUTPATIENT)
Dept: PHYSICAL THERAPY | Age: 37
End: 2017-08-30
Attending: FAMILY MEDICINE
Payer: COMMERCIAL

## 2017-09-13 ENCOUNTER — HOSPITAL ENCOUNTER (OUTPATIENT)
Dept: LAB | Age: 37
Discharge: HOME | End: 2017-09-13
Payer: COMMERCIAL

## 2017-09-13 ENCOUNTER — OFFICE VISIT (OUTPATIENT)
Dept: FAMILY MEDICINE CLINIC | Age: 37
End: 2017-09-13

## 2017-09-13 VITALS
DIASTOLIC BLOOD PRESSURE: 84 MMHG | OXYGEN SATURATION: 99 % | HEIGHT: 67 IN | SYSTOLIC BLOOD PRESSURE: 120 MMHG | TEMPERATURE: 98.2 F | HEART RATE: 77 BPM | RESPIRATION RATE: 20 BRPM | WEIGHT: 213 LBS | BODY MASS INDEX: 33.43 KG/M2

## 2017-09-13 DIAGNOSIS — Z00.00 ENCOUNTER FOR WELL ADULT EXAM WITHOUT ABNORMAL FINDINGS: Primary | ICD-10-CM

## 2017-09-13 DIAGNOSIS — E78.2 MIXED HYPERLIPIDEMIA: ICD-10-CM

## 2017-09-13 DIAGNOSIS — Z23 ENCOUNTER FOR IMMUNIZATION: ICD-10-CM

## 2017-09-13 DIAGNOSIS — Z13.29 SCREENING FOR THYROID DISORDER: ICD-10-CM

## 2017-09-13 DIAGNOSIS — Z13.0 SCREENING FOR DEFICIENCY ANEMIA: ICD-10-CM

## 2017-09-13 DIAGNOSIS — Z13.1 SCREENING FOR DIABETES MELLITUS: ICD-10-CM

## 2017-09-13 LAB
ALBUMIN SERPL-MCNC: 3.2 G/DL (ref 3.4–5)
ALBUMIN/GLOB SERPL: 0.9 {RATIO} (ref 0.8–1.7)
ALP SERPL-CCNC: 56 U/L (ref 45–117)
ALT SERPL-CCNC: 18 U/L (ref 13–56)
ANION GAP SERPL CALC-SCNC: 8 MMOL/L (ref 3–18)
AST SERPL-CCNC: 13 U/L (ref 15–37)
BILIRUB SERPL-MCNC: 0.1 MG/DL (ref 0.2–1)
BUN SERPL-MCNC: 7 MG/DL (ref 7–18)
BUN/CREAT SERPL: 10 (ref 12–20)
CALCIUM SERPL-MCNC: 8.6 MG/DL (ref 8.5–10.1)
CHLORIDE SERPL-SCNC: 105 MMOL/L (ref 100–108)
CHOLEST SERPL-MCNC: 164 MG/DL
CO2 SERPL-SCNC: 24 MMOL/L (ref 21–32)
CREAT SERPL-MCNC: 0.69 MG/DL (ref 0.6–1.3)
GLOBULIN SER CALC-MCNC: 3.6 G/DL (ref 2–4)
GLUCOSE SERPL-MCNC: 81 MG/DL (ref 74–99)
HCT VFR BLD AUTO: 37 % (ref 35–45)
HDLC SERPL-MCNC: 51 MG/DL (ref 40–60)
HDLC SERPL: 3.2 {RATIO} (ref 0–5)
HGB BLD-MCNC: 12.2 G/DL (ref 12–16)
LDLC SERPL CALC-MCNC: 96.6 MG/DL (ref 0–100)
LIPID PROFILE,FLP: NORMAL
POTASSIUM SERPL-SCNC: 4.5 MMOL/L (ref 3.5–5.5)
PROT SERPL-MCNC: 6.8 G/DL (ref 6.4–8.2)
SODIUM SERPL-SCNC: 137 MMOL/L (ref 136–145)
TRIGL SERPL-MCNC: 82 MG/DL (ref ?–150)
TSH SERPL DL<=0.05 MIU/L-ACNC: 1.04 UIU/ML (ref 0.36–3.74)
VLDLC SERPL CALC-MCNC: 16.4 MG/DL

## 2017-09-13 PROCEDURE — 80053 COMPREHEN METABOLIC PANEL: CPT | Performed by: NURSE PRACTITIONER

## 2017-09-13 PROCEDURE — 85018 HEMOGLOBIN: CPT | Performed by: NURSE PRACTITIONER

## 2017-09-13 PROCEDURE — 82652 VIT D 1 25-DIHYDROXY: CPT | Performed by: NURSE PRACTITIONER

## 2017-09-13 PROCEDURE — 80061 LIPID PANEL: CPT | Performed by: NURSE PRACTITIONER

## 2017-09-13 PROCEDURE — 84443 ASSAY THYROID STIM HORMONE: CPT | Performed by: NURSE PRACTITIONER

## 2017-09-13 RX ORDER — ATORVASTATIN CALCIUM 20 MG/1
20 TABLET, FILM COATED ORAL DAILY
Qty: 90 TAB | Refills: 1 | Status: SHIPPED | OUTPATIENT
Start: 2017-09-13 | End: 2017-09-19 | Stop reason: SDUPTHER

## 2017-09-13 NOTE — MR AVS SNAPSHOT
Visit Information Date & Time Provider Department Dept. Phone Encounter #  
 9/13/2017  3:00 PM Alex Pathak NP [phone] Upcoming Health Maintenance Date Due DTaP/Tdap/Td series (1 - Tdap) 2/21/2000 INFLUENZA AGE 9 TO ADULT 8/1/2017 PAP AKA CERVICAL CYTOLOGY 7/5/2020 Allergies as of 9/13/2017  Review Complete On: 9/13/2017 By: Alex Pathak NP No Known Allergies Current Immunizations  Reviewed on 9/13/2017 Name Date Influenza Vaccine PF  Incomplete Reviewed by Alex Pathak NP on 9/13/2017 at  3:15 PM  
You Were Diagnosed With   
  
 Codes Comments Mixed hyperlipidemia    -  Primary ICD-10-CM: N51.5 ICD-9-CM: 272.2 Screening for diabetes mellitus     ICD-10-CM: Z13.1 ICD-9-CM: V77.1 Screening for deficiency anemia     ICD-10-CM: Z13.0 ICD-9-CM: V78.1 Screening for thyroid disorder     ICD-10-CM: Z13.29 ICD-9-CM: V77.0 Encounter for immunization     ICD-10-CM: S68 ICD-9-CM: V03.89 Vitals BP Pulse Temp Resp Height(growth percentile) Weight(growth percentile) 120/84 (BP 1 Location: Left arm, BP Patient Position: Sitting) 77 98.2 °F (36.8 °C) (Oral) 20 5' 7\" (1.702 m) 213 lb (96.6 kg) LMP SpO2 BMI OB Status Smoking Status 08/13/2017 (Approximate) 99% 33.36 kg/m2 Having regular periods Never Smoker BMI and BSA Data Body Mass Index Body Surface Area  
 33.36 kg/m 2 2.14 m 2 Preferred Pharmacy Pharmacy Name Phone Joleen Garrison [address] [facility], [address] [phone] Your Updated Medication List  
  
   
This list is accurate as of: 9/13/17  3:28 PM.  Always use your most recent med list.  
  
  
  
  
 atorvastatin 20 mg tablet Commonly known as:  LIPITOR Take 1 Tab by mouth daily. naproxen sodium 550 mg tablet Commonly known as:  Cedric Grout Take 1 Tab by mouth two (2) times daily (with meals). orphenadrine citrate 100 mg sr tablet Commonly known as:  NORFLEX Take 1 Tab by mouth two (2) times a day. GURPREET PO Take 1 Tab by mouth daily. VITAMIN D3 5,000 unit Tab tablet Generic drug:  cholecalciferol (VITAMIN D3) Take  by mouth daily. Prescriptions Sent to Pharmacy Refills  
 atorvastatin (LIPITOR) 20 mg tablet 1 Sig: Take 1 Tab by mouth daily. Class: Normal  
 Pharmacy: [facility] [address] [facility] [address], [address] [facility] Ph #: [phone] Route: Oral  
  
We Performed the Following INFLUENZA VIRUS VACCINE, PRESERVATIVE FREE SYRINGE, 3 YRS AND OLDER [20842 CPT(R)] To-Do List   
 09/13/2017 Lab:  HGB & HCT   
  
 09/13/2017 Lab:  LIPID PANEL   
  
 09/13/2017 Lab:  METABOLIC PANEL, COMPREHENSIVE   
  
 09/13/2017 Lab:  TSH 3RD GENERATION   
  
 09/13/2017 Lab:  VITAMIN D, 1, 25 DIHYDROXY Introducing [facility]! Dear Landy Malik: Thank you for requesting a Cent[employer] account. Our records indicate that you already have an active Centrix account. You can access your account anytime at [email or web address] Did you know that you can access your hospital and ER discharge instructions at any time in Centrix? You can also review all of your test results from your hospital stay or ER visit. Additional Information If you have questions, please visit the Frequently Asked Questions section of the Centrix website at [email or web address] Remember, Centrix is NOT to be used for urgent needs. For medical emergencies, dial 911. Now available from your iPhone and Android! Please provide this summary of care documentation to your next provider. Your primary care clinician is listed as [address]. If you have any questions after today's visit, please call [phone].

## 2017-09-13 NOTE — PROGRESS NOTES
Subjective:   44 y.o. female for Well Woman Check. Her gyne and breast care is done elsewhere by her Ob-Gyne physician. ROS: Feeling generally well. No TIA's or unusual headaches, no dysphagia. No prolonged cough. No dyspnea or chest pain on exertion. No abdominal pain, change in bowel habits, black or bloody stools. No urinary tract symptoms. No new or unusual musculoskeletal symptoms. Specific concerns today: needs refill on cholesterol med. She also is having some stomach issues but not sure what it is. Objective: The patient appears well, alert, oriented x 3, in no distress. Visit Vitals    /84 (BP 1 Location: Left arm, BP Patient Position: Sitting)    Pulse 77    Temp 98.2 °F (36.8 °C) (Oral)    Resp 20    Ht 5' 7\" (1.702 m)    Wt 213 lb (96.6 kg)    LMP 08/13/2017 (Approximate)    SpO2 99%    BMI 33.36 kg/m2     ENT normal.  Neck supple. No adenopathy or thyromegaly. ADA. Lungs are clear, good air entry, no wheezes, rhonchi or rales. S1 and S2 normal, no murmurs, regular rate and rhythm. Abdomen soft without tenderness, guarding, mass or organomegaly. Extremities show no edema, normal peripheral pulses. Neurological is normal, no focal findings. Breast and Pelvic exams are deferred. Assessment/Plan:   Well Woman  increase physical activity, continue present plan, routine labs ordered    ICD-10-CM ICD-9-CM    1. Mixed hyperlipidemia E78.2 272.2 atorvastatin (LIPITOR) 20 mg tablet      LIPID PANEL   2. Screening for diabetes mellitus F19.4 N51.9 METABOLIC PANEL, COMPREHENSIVE   3. Screening for deficiency anemia Z13.0 V78.1 HGB & HCT      VITAMIN D, 1, 25 DIHYDROXY   4. Screening for thyroid disorder Z13.29 V77.0 TSH 3RD GENERATION   5. Encounter for immunization Z23 V03.89 INFLUENZA VIRUS VACCINE, PRESERVATIVE FREE SYRINGE, 3 YRS AND OLDER       PLAN:  We discussed her symptoms and patient will monitor them and see if food is a trigger.   Dif dx GERD, diverticulitis. Pt would like to get the flu shot. We discussed diet and exercise and her medications. Pt will call with any concerns    Pt was given after visit summary.

## 2017-09-13 NOTE — PROGRESS NOTES
Brent Vaughn is a 44 y.o. female  Chief Complaint   Patient presents with    Physical     refills have run out for atorvastatin. 1. Have you been to the ER, urgent care clinic since your last visit? Hospitalized since your last visit? NO    2. Have you seen or consulted any other health care providers outside of the [address] [place] [address] since your last visit? Include any pap smears or colon screening.  Yes When: Aug 13,2017 Where: [facility] Reason for visit: Physical  therapy to help eleviate back pain

## 2017-09-14 ENCOUNTER — TELEPHONE (OUTPATIENT)
Dept: FAMILY MEDICINE CLINIC | Age: 37
End: 2017-09-14

## 2017-09-14 LAB — 1,25(OH)2D3 SERPL-MCNC: 103 PG/ML (ref 19.9–79.3)

## 2017-09-14 NOTE — PROGRESS NOTES
Please advise Pt that her kidney and liver functions are fine. There is no signs of diabetes. Her cholesterol numbers are good. Her TSH is in normal range. There is no signs of anemia.

## 2017-09-14 NOTE — TELEPHONE ENCOUNTER
----- Message from Azucena Seth NP sent at 9/14/2017  8:48 AM EDT -----  Please advise Pt that her kidney and liver functions are fine. There is no signs of diabetes. Her cholesterol numbers are good. Her TSH is in normal range. There is no signs of anemia. appt 2/1/17  Labs 11/9/16  Refill 6/21/17

## 2017-09-14 NOTE — TELEPHONE ENCOUNTER
----- Message from Elaine Willis NP sent at 9/14/2017  4:02 PM EDT -----  Please advise Pt that her Vit D is very elevated so if she is taking any over the counter supplements to please stop them.

## 2017-09-14 NOTE — PROGRESS NOTES
Please advise Pt that her Vit D is very elevated so if she is taking any over the counter supplements to please stop them.

## 2017-09-19 DIAGNOSIS — M62.830 MUSCLE SPASM OF BACK: ICD-10-CM

## 2017-09-19 DIAGNOSIS — E78.2 MIXED HYPERLIPIDEMIA: ICD-10-CM

## 2017-09-19 RX ORDER — NAPROXEN SODIUM 550 MG/1
550 TABLET ORAL 2 TIMES DAILY WITH MEALS
Qty: 30 TAB | Refills: 0 | Status: SHIPPED | OUTPATIENT
Start: 2017-09-19 | End: 2018-09-13 | Stop reason: SDUPTHER

## 2017-09-19 RX ORDER — ATORVASTATIN CALCIUM 20 MG/1
20 TABLET, FILM COATED ORAL DAILY
Qty: 90 TAB | Refills: 1 | Status: SHIPPED | OUTPATIENT
Start: 2017-09-19 | End: 2018-03-19 | Stop reason: SDUPTHER

## 2017-09-19 RX ORDER — ORPHENADRINE CITRATE 100 MG/1
100 TABLET, EXTENDED RELEASE ORAL 2 TIMES DAILY
Qty: 30 TAB | Refills: 0 | Status: SHIPPED | OUTPATIENT
Start: 2017-09-19 | End: 2018-09-13 | Stop reason: SDUPTHER

## 2017-09-19 NOTE — TELEPHONE ENCOUNTER
Patient would like prescriptions sent to [facility] in [facility].

## 2017-09-19 NOTE — TELEPHONE ENCOUNTER
Pt called again stating she wants [facility] she stated they would not transfer her medication. I told the patient she can call and have it transferred.  She said she tried and wants us to call [phone]

## 2017-09-29 NOTE — PROGRESS NOTES
[facility]  [address]  [place], [address]  [phone] fax    Physical Therapy Discharge Summary  Patient name: Temi Rankin Start of Care: 2017   Referral source: Giovanna Lauren MD : 1980                          Medical Diagnosis: Chronic midline thoracic back pain [M54.6, G89.29] Onset Date:8 months                          Treatment Diagnosis: Back pain   Prior Hospitalization: see medical history Provider#: [id number]   Medications: Verified on Patient summary List    Comorbidities: high cholesterol   Prior Level of Function: Pt able to perform work duties and daily tasks without back pain. Visits from Start of Care: 1    Missed Visits: 1  Reporting Period : 2017 to 2017      Summary of Care:  Short Term Goals: To be accomplished in 1 weeks:  1. Pt will be I and compliant with HEP to promote self management of condition. Long Term Goals: To be accomplished in 2-3 weeks:  1. Pt will report at least 50% improvement in back pain to increase ease of ADL performance. 2. Pt will demonstrate proper SA activation and endurance with QP interventions void of pain for increased core activation. 3. Pt will demonstrate proper PPT form in hook lying to improve lumbopelvic mobility with transfers. ASSESSMENT/RECOMMENDATIONS: Unable to clearly reproduce pt's symptoms at initial evaluation. Pt was supposed to return to therapy as needed following going out of town. She failed to schedule any further sessions, will D/C at this time.     [x]Discontinue therapy: []Patient has reached or is progressing toward set goals      [x]Patient is non-compliant or has abdicated      []Due to lack of appreciable progress towards set goals    Schuyler Spatz, DPT 2017 2:46 PM

## 2017-12-18 ENCOUNTER — OFFICE VISIT (OUTPATIENT)
Dept: FAMILY MEDICINE CLINIC | Age: 37
End: 2017-12-18

## 2017-12-18 VITALS
HEIGHT: 67 IN | TEMPERATURE: 98.9 F | HEART RATE: 65 BPM | BODY MASS INDEX: 32.8 KG/M2 | OXYGEN SATURATION: 98 % | DIASTOLIC BLOOD PRESSURE: 78 MMHG | RESPIRATION RATE: 18 BRPM | WEIGHT: 209 LBS | SYSTOLIC BLOOD PRESSURE: 130 MMHG

## 2017-12-18 DIAGNOSIS — H69.82 DYSFUNCTION OF LEFT EUSTACHIAN TUBE: ICD-10-CM

## 2017-12-18 DIAGNOSIS — M54.50 CHRONIC MIDLINE LOW BACK PAIN WITHOUT SCIATICA: ICD-10-CM

## 2017-12-18 DIAGNOSIS — G89.29 CHRONIC MIDLINE LOW BACK PAIN WITHOUT SCIATICA: ICD-10-CM

## 2017-12-18 DIAGNOSIS — M25.561 ACUTE PAIN OF RIGHT KNEE: Primary | ICD-10-CM

## 2017-12-18 NOTE — PATIENT INSTRUCTIONS

## 2017-12-18 NOTE — MR AVS SNAPSHOT
Visit Information Date & Time Provider Department Dept. Phone Encounter #  
 12/18/2017 10:45 AM Jose Miguel [address] [place] [facility] [id number] Follow-up Instructions Return if symptoms worsen or fail to improve. Upcoming Health Maintenance Date Due  
 PAP AKA CERVICAL CYTOLOGY 7/5/2020 DTaP/Tdap/Td series (2 - Td) 9/13/2027 Allergies as of 12/18/2017  Review Complete On: 12/18/2017 By: Houston Akhtar No Known Allergies Current Immunizations  Reviewed on 9/13/2017 Name Date Influenza Vaccine (Quad) PF 9/13/2017 Tdap 9/13/2017 Not reviewed this visit You Were Diagnosed With   
  
 Codes Comments Acute pain of right knee    -  Primary ICD-10-CM: M25.561 ICD-9-CM: 719.46 Dysfunction of left eustachian tube     ICD-10-CM: O57.95 ICD-9-CM: 381.81 Vitals BP Pulse Temp Resp Height(growth percentile) Weight(growth percentile) 130/78 (BP 1 Location: Left arm, BP Patient Position: Sitting) 65 98.9 °F (37.2 °C) (Oral) 18 5' 7\" (1.702 m) 209 lb (94.8 kg) SpO2 BMI OB Status Smoking Status 98% 32.73 kg/m2 Having regular periods Never Smoker BMI and BSA Data Body Mass Index Body Surface Area 32.73 kg/m 2 2.12 m 2 Preferred Pharmacy Pharmacy Name Phone [facility], [address] [place] [address] [phone] Your Updated Medication List  
  
   
This list is accurate as of: 12/18/17 12:08 PM.  Always use your most recent med list.  
  
  
  
  
 atorvastatin 20 mg tablet Commonly known as:  LIPITOR Take 1 Tab by mouth daily. naproxen sodium 550 mg tablet Commonly known as:  Severo Panda Take 1 Tab by mouth two (2) times daily (with meals). orphenadrine citrate 100 mg sr tablet Commonly known as:  NORFLEX Take 1 Tab by mouth two (2) times a day. GURPREET PO Take 1 Tab by mouth daily. VITAMIN D3 5,000 unit Tab tablet Generic drug:  cholecalciferol (VITAMIN D3) Take  by mouth daily. Follow-up Instructions Return if symptoms worsen or fail to improve. Patient Instructions DASH Diet: Care Instructions Your Care Instructions The DASH diet is an eating plan that can help lower your blood pressure. DASH stands for Dietary Approaches to Stop Hypertension. Hypertension is high blood pressure. The DASH diet focuses on eating foods that are high in calcium, potassium, and magnesium. These nutrients can lower blood pressure. The foods that are highest in these nutrients are fruits, vegetables, low-fat dairy products, nuts, seeds, and legumes. But taking calcium, potassium, and magnesium supplements instead of eating foods that are high in those nutrients does not have the same effect. The DASH diet also includes whole grains, fish, and poultry. The DASH diet is one of several lifestyle changes your doctor may recommend to lower your high blood pressure. Your doctor may also want you to decrease the amount of sodium in your diet. Lowering sodium while following the DASH diet can lower blood pressure even further than just the DASH diet alone. Follow-up care is a key part of your treatment and safety. Be sure to make and go to all appointments, and call your doctor if you are having problems. It's also a good idea to know your test results and keep a list of the medicines you take. How can you care for yourself at home? Following the DASH diet · Eat 4 to 5 servings of fruit each day. A serving is 1 medium-sized piece of fruit, ½ cup chopped or canned fruit, 1/4 cup dried fruit, or 4 ounces (½ cup) of fruit juice. Choose fruit more often than fruit juice. · Eat 4 to 5 servings of vegetables each day. A serving is 1 cup of lettuce or raw leafy vegetables, ½ cup of chopped or cooked vegetables, or 4 ounces (½ cup) of vegetable juice. Choose vegetables more often than vegetable juice. · Get 2 to 3 servings of low-fat and fat-free dairy each day. A serving is 8 ounces of milk, 1 cup of yogurt, or 1 ½ ounces of cheese. · Eat 6 to 8 servings of grains each day. A serving is 1 slice of bread, 1 ounce of dry cereal, or ½ cup of cooked rice, pasta, or cooked cereal. Try to choose whole-grain products as much as possible. · Limit lean meat, poultry, and fish to 2 servings each day. A serving is 3 ounces, about the size of a deck of cards. · Eat 4 to 5 servings of nuts, seeds, and legumes (cooked dried beans, lentils, and split peas) each week. A serving is 1/3 cup of nuts, 2 tablespoons of seeds, or ½ cup of cooked beans or peas. · Limit fats and oils to 2 to 3 servings each day. A serving is 1 teaspoon of vegetable oil or 2 tablespoons of salad dressing. · Limit sweets and added sugars to 5 servings or less a week. A serving is 1 tablespoon jelly or jam, ½ cup sorbet, or 1 cup of lemonade. · Eat less than 2,300 milligrams (mg) of sodium a day. If you limit your sodium to 1,500 mg a day, you can lower your blood pressure even more. Tips for success · Start small. Do not try to make dramatic changes to your diet all at once. You might feel that you are missing out on your favorite foods and then be more likely to not follow the plan. Make small changes, and stick with them. Once those changes become habit, add a few more changes. · Try some of the following: ¨ Make it a goal to eat a fruit or vegetable at every meal and at snacks. This will make it easy to get the recommended amount of fruits and vegetables each day. ¨ Try yogurt topped with fruit and nuts for a snack or healthy dessert. ¨ Add lettuce, tomato, cucumber, and onion to sandwiches. ¨ Combine a ready-made pizza crust with low-fat mozzarella cheese and lots of vegetable toppings. Try using tomatoes, squash, spinach, broccoli, carrots, cauliflower, and onions. ¨ Have a variety of cut-up vegetables with a low-fat dip as an appetizer instead of chips and dip. ¨ Sprinkle sunflower seeds or chopped almonds over salads. Or try adding chopped walnuts or almonds to cooked vegetables. ¨ Try some vegetarian meals using beans and peas. Add garbanzo or kidney beans to salads. Make burritos and tacos with mashed liang beans or black beans. Where can you learn more? Go to http://genaro-renu.info/. Enter P313 in the search box to learn more about \"DASH Diet: Care Instructions. \" Current as of: September 21, 2016 Content Version: 11.4 © 8302-9444 [employer]. Care instructions adapted under license by [employer] (which disclaims liability or warranty for this information). If you have questions about a medical condition or this instruction, always ask your healthcare professional. [place] [address] any warranty or liability for your use of this information. Introducing [facility]! Dear Nikki Felix: Thank you for requesting a Bit[employer] account. Our records indicate that you already have an active Bit[employer] account. You can access your account anytime at [email or web address] Did you know that you can access your hospital and ER discharge instructions at any time in Bit[employer]? You can also review all of your test results from your hospital stay or ER visit. Additional Information If you have questions, please visit the Frequently Asked Questions section of the [employer] website at [email or web address] Remember, [employer] is NOT to be used for urgent needs. For medical emergencies, dial 911. Now available from your iPhone and Android! Please provide this summary of care documentation to your next provider. Your primary care clinician is listed as [address]. If you have any questions after today's visit, please call [phone].

## 2017-12-18 NOTE — PROGRESS NOTES
Teresa Abarca is a  44 y.o. female presents today for office visit for Chronic knee pain (right), tingling in left ear x 4dys      1. Have you been to the ER, urgent care clinic or hospitalized since your last visit? NO      2. Have you seen or consulted any other health care providers outside of the [address] [place] [address] since your last visit (Include any pap smears or colon screening)?  Yes OBGYN

## 2017-12-24 NOTE — PROGRESS NOTES
Cely Parr is a 44 y.o.  female and presents with Knee Pain; Other; and Ear Pain      SUBJECTIVE:    Knee Pain  Patient complains of right knee pain. Onset of the symptoms was problem is longstanding, this episode began 1 week ago. Inciting event: none known. Current symptoms include severity of pain = 5 out of 10 and popping sensation. Associated symptoms: knee giving out. Aggravating symptoms: standing, walking, rising after sitting. Patient's overall course: gradually worsening Patient has had prior knee problems. Patient denies fever. Evaluation to date: none. Treatment to date: OTC analgesics: not very effective. Pt has also had some recurrent back pain for which she briefly went to PT. The pain continues to be recurrent. Allergic Rhinitis  Patient presents for evaluation of allergic symptoms. Symptoms include left ear fluttering and mild pressure and are not present in a seasonal pattern. Precipitants include weather change. Treatment in the past has included intranasal steroids: Flonase. Treatment currently includes none and is not effective in the patient's opinion. Respiratory ROS: negative for - shortness of breath  Cardiovascular ROS: negative for - chest pain    Current Outpatient Prescriptions   Medication Sig    atorvastatin (LIPITOR) 20 mg tablet Take 1 Tab by mouth daily.  naproxen sodium (ANAPROX) 550 mg tablet Take 1 Tab by mouth two (2) times daily (with meals).  orphenadrine citrate (NORFLEX) 100 mg sr tablet Take 1 Tab by mouth two (2) times a day.  LEVONORGESTREL-ETHIN ESTRADIOL (GURPREET PO) Take 1 Tab by mouth daily.  cholecalciferol, VITAMIN D3, (VITAMIN D3) 5,000 unit tab tablet Take  by mouth daily. No current facility-administered medications for this visit.           OBJECTIVE:  alert, well appearing, and in no distress  Visit Vitals    /78 (BP 1 Location: Left arm, BP Patient Position: Sitting)    Pulse 65    Temp 98.9 °F (37.2 °C) (Oral)  Resp 18    Ht 5' 7\" (1.702 m)    Wt 209 lb (94.8 kg)    SpO2 98%    BMI 32.73 kg/m2      well developed and well nourished  Eyes - pupils equal and reactive, extraocular eye movements intact  Ears - bilateral TM's and external ear canals normal  Nose - normal and patent, no erythema, discharge or polyps  Mouth - mucous membranes moist, pharynx normal without lesions  Chest - clear to auscultation, no wheezes, rales or rhonchi, symmetric air entry  Heart - normal rate, regular rhythm, normal S1, S2, no murmurs, rubs, clicks or gallops  Musculoskeletal - right knee with some joint line tenderness. No effusion appreciated. Assessment/Plan      ICD-10-CM ICD-9-CM    1. Acute pain of right knee M25.561 719.46 Will refer to Dr Lary Hdez for further evaluation    2. Chronic midline low back pain without sciatica M54.5 724.2 Will refer to Dr Ld Villagomez     T28.03 338.29    3. Dysfunction of left eustachian tube H69.82 381.81 Pt to use Flonase      Follow-up Disposition:  Return if symptoms worsen or fail to improve. Reviewed plan of care. Patient has provided input and agrees with goals.

## 2018-03-01 ENCOUNTER — OFFICE VISIT (OUTPATIENT)
Dept: INTERNAL MEDICINE CLINIC | Age: 38
End: 2018-03-01

## 2018-03-01 VITALS
TEMPERATURE: 97.9 F | SYSTOLIC BLOOD PRESSURE: 120 MMHG | BODY MASS INDEX: 33.12 KG/M2 | WEIGHT: 211 LBS | DIASTOLIC BLOOD PRESSURE: 68 MMHG | HEIGHT: 67 IN | OXYGEN SATURATION: 99 % | HEART RATE: 62 BPM

## 2018-03-01 DIAGNOSIS — R05.9 COUGH: ICD-10-CM

## 2018-03-01 DIAGNOSIS — J09.X2 INFLUENZA A (H5N1): Primary | ICD-10-CM

## 2018-03-01 LAB
QUICKVUE INFLUENZA TEST: NEGATIVE
QUICKVUE INFLUENZA TEST: POSITIVE
VALID INTERNAL CONTROL?: YES
VALID INTERNAL CONTROL?: YES

## 2018-03-01 RX ORDER — LEVONORGESTREL AND ETHINYL ESTRADIOL 0.15-0.03
KIT ORAL
COMMUNITY
Start: 2018-02-23 | End: 2018-09-21 | Stop reason: SDUPTHER

## 2018-03-01 RX ORDER — CODEINE PHOSPHATE AND GUAIFENESIN 10; 100 MG/5ML; MG/5ML
5 SOLUTION ORAL
Qty: 118 ML | Refills: 0 | Status: SHIPPED | OUTPATIENT
Start: 2018-03-01 | End: 2018-05-01 | Stop reason: ALTCHOICE

## 2018-03-01 RX ORDER — OSELTAMIVIR PHOSPHATE 75 MG/1
75 CAPSULE ORAL 2 TIMES DAILY
Qty: 10 CAP | Refills: 0 | Status: SHIPPED | OUTPATIENT
Start: 2018-03-01 | End: 2018-03-06

## 2018-03-01 NOTE — LETTER
NOTIFICATION RETURN TO WORK / SCHOOL 
 
3/1/2018 12:07 PM 
 
Ms. Marge Pascual [address] [address] To Whom It May Concern: 
 
Marge Pascual is currently under the care of Ankita Calabrese. She will return to work/school on: 03/05/18 If there are questions or concerns please have the patient contact our office.  
 
 
 
Sincerely, 
 
 
 
 
 
Leif Somers NP

## 2018-03-01 NOTE — PROGRESS NOTES
Naldo Seals is a 43 y.o. female presenting today for Nasal Congestion; Cough; and Ear Pain  . HPI:  Naldo Seals presents to the office today for nasal congestion, non-productive cough and ear pain since Monday night. Patient is afebrile today and denies any dyspnea. Review of Systems   HENT: Positive for congestion and ear pain. Respiratory: Positive for cough. Negative for sputum production and shortness of breath. Cardiovascular: Negative for chest pain and palpitations. No Known Allergies    Current Outpatient Prescriptions   Medication Sig Dispense Refill    guaiFENesin-codeine (ROBITUSSIN AC) 100-10 mg/5 mL solution Take 5 mL by mouth three (3) times daily as needed for Cough. Max Daily Amount: 15 mL. Do not drive if taking this medication 118 mL 0    oseltamivir (TAMIFLU) 75 mg capsule Take 1 Cap by mouth two (2) times a day for 5 days. 10 Cap 0    atorvastatin (LIPITOR) 20 mg tablet Take 1 Tab by mouth daily. 90 Tab 1    naproxen sodium (ANAPROX) 550 mg tablet Take 1 Tab by mouth two (2) times daily (with meals). 30 Tab 0    orphenadrine citrate (NORFLEX) 100 mg sr tablet Take 1 Tab by mouth two (2) times a day. 30 Tab 0    LEVONORGESTREL-ETHIN ESTRADIOL (GURPREET PO) Take 1 Tab by mouth daily.  GURPREET 0.15-0.03 mg tab       cholecalciferol, VITAMIN D3, (VITAMIN D3) 5,000 unit tab tablet Take  by mouth daily. Past Medical History:   Diagnosis Date    Hypercholesteremia     Ill-defined condition     High cholesterol. No past surgical history on file.     Social History     Social History    Marital status: SINGLE     Spouse name: N/A    Number of children: N/A    Years of education: N/A     Occupational History          Social History Main Topics    Smoking status: Never Smoker    Smokeless tobacco: Never Used    Alcohol use Yes      Comment: socially    Drug use: No    Sexual activity: No     Other Topics Concern    Not on file Social History Narrative       Patient does not have an advanced directive on file    Vitals:    03/01/18 1127   BP: 120/68   Pulse: 62   Temp: 97.9 °F (36.6 °C)   TempSrc: Tympanic   SpO2: 99%   Weight: 211 lb (95.7 kg)   Height: 5' 7\" (1.702 m)   PainSc:   0 - No pain   LMP: 03/01/2018       Physical Exam   Constitutional: No distress. HENT:   Right Ear: External ear normal.   Left Ear: External ear normal.   Mouth/Throat: No oropharyngeal exudate. Cardiovascular: Normal rate, regular rhythm and normal heart sounds. Pulmonary/Chest: Effort normal and breath sounds normal.   Lymphadenopathy:     She has no cervical adenopathy. Neurological: She is alert. Nursing note and vitals reviewed. No visits with results within 3 Month(s) from this visit. Latest known visit with results is:    Hospital Outpatient Visit on 09/13/2017   Component Date Value Ref Range Status    Sodium 09/13/2017 137  136 - 145 mmol/L Final    Potassium 09/13/2017 4.5  3.5 - 5.5 mmol/L Final    Chloride 09/13/2017 105  100 - 108 mmol/L Final    CO2 09/13/2017 24  21 - 32 mmol/L Final    Anion gap 09/13/2017 8  3.0 - 18 mmol/L Final    Glucose 09/13/2017 81  74 - 99 mg/dL Final    BUN 09/13/2017 7  7.0 - 18 MG/DL Final    Creatinine 09/13/2017 0.69  0.6 - 1.3 MG/DL Final    BUN/Creatinine ratio 09/13/2017 10* 12 - 20   Final    GFR est AA 09/13/2017 >60  >60 ml/min/1.73m2 Final    GFR est non-AA 09/13/2017 >60  >60 ml/min/1.73m2 Final    Comment: (NOTE)  Estimated GFR is calculated using the Modification of Diet in Renal   Disease (MDRD) Study equation, reported for both  Americans   (GFRAA) and non- Americans (GFRNA), and normalized to 1.73m2   body surface area. The physician must decide which value applies to   the patient. The MDRD study equation should only be used in   individuals age 25 or older.  It has not been validated for the   following: pregnant women, patients with serious comorbid conditions,   or on certain medications, or persons with extremes of body size,   muscle mass, or nutritional status.  Calcium 09/13/2017 8.6  8.5 - 10.1 MG/DL Final    Bilirubin, total 09/13/2017 0.1* 0.2 - 1.0 MG/DL Final    ALT (SGPT) 09/13/2017 18  13 - 56 U/L Final    AST (SGOT) 09/13/2017 13* 15 - 37 U/L Final    Alk. phosphatase 09/13/2017 56  45 - 117 U/L Final    Protein, total 09/13/2017 6.8  6.4 - 8.2 g/dL Final    Albumin 09/13/2017 3.2* 3.4 - 5.0 g/dL Final    Globulin 09/13/2017 3.6  2.0 - 4.0 g/dL Final    A-G Ratio 09/13/2017 0.9  0.8 - 1.7   Final    LIPID PROFILE 09/13/2017        Final    Cholesterol, total 09/13/2017 164  <200 MG/DL Final    Triglyceride 09/13/2017 82  <150 MG/DL Final    Comment: The drugs N-acetylcysteine (NAC) and  Metamiszole have been found to cause falsely  low results in this chemical assay. Please  be sure to submit blood samples obtained  BEFORE administration of either of these  drugs to assure correct results.  HDL Cholesterol 09/13/2017 51  40 - 60 MG/DL Final    LDL, calculated 09/13/2017 96.6  0 - 100 MG/DL Final    VLDL, calculated 09/13/2017 16.4  MG/DL Final    CHOL/HDL Ratio 09/13/2017 3.2  0 - 5.0   Final    HGB 09/13/2017 12.2  12.0 - 16.0 g/dL Final    HCT 09/13/2017 37.0  35.0 - 45.0 % Final    Calcitriol (Vit D 1, 25 di-OH) 09/13/2017 103.0* 19.9 - 79.3 pg/mL Final    Comment: (NOTE)  Performed At: [address] [id number]  Phani Lawson MD CP:[id number]      TSH 09/13/2017 1.04  0.36 - 3.74 uIU/mL Final       .No results found for any visits on 03/01/18. Assessment / Plan:      ICD-10-CM ICD-9-CM    1. Influenza A (H5N1) J09. X2 488.02 AMB POC RAPID INFLUENZA TEST      AMB POC RAPID INFLUENZA TEST      oseltamivir (TAMIFLU) 75 mg capsule   2.  Cough R05 786.2 guaiFENesin-codeine (ROBITUSSIN AC) 100-10 mg/5 mL solution     Influenza A +  Tamiflu rx  Cheratussin rx  Work note given      Follow-up Disposition:  Return if symptoms worsen or fail to improve. I asked the patient if she  had any questions and answered her  questions.   The patient stated that she understands the treatment plan and agrees with the treatment plan

## 2018-03-19 ENCOUNTER — OFFICE VISIT (OUTPATIENT)
Dept: FAMILY MEDICINE CLINIC | Age: 38
End: 2018-03-19

## 2018-03-19 VITALS
BODY MASS INDEX: 32.8 KG/M2 | RESPIRATION RATE: 18 BRPM | HEIGHT: 67 IN | SYSTOLIC BLOOD PRESSURE: 139 MMHG | TEMPERATURE: 98.3 F | DIASTOLIC BLOOD PRESSURE: 89 MMHG | HEART RATE: 67 BPM | OXYGEN SATURATION: 97 % | WEIGHT: 209 LBS

## 2018-03-19 DIAGNOSIS — E78.2 MIXED HYPERLIPIDEMIA: ICD-10-CM

## 2018-03-19 RX ORDER — ATORVASTATIN CALCIUM 20 MG/1
20 TABLET, FILM COATED ORAL DAILY
Qty: 90 TAB | Refills: 1 | Status: SHIPPED | OUTPATIENT
Start: 2018-03-19 | End: 2018-09-13 | Stop reason: SDUPTHER

## 2018-03-19 NOTE — PROGRESS NOTES
Chief Complaint   Patient presents with    Cholesterol Problem     med refill      1. Have you been to the ER, urgent care clinic since your last visit? Hospitalized since your last visit? No    2. Have you seen or consulted any other health care providers outside of the [address] [place] [address] since your last visit? Include any pap smears or colon screening.  No

## 2018-03-19 NOTE — MR AVS SNAPSHOT
[facility] 
 
 
 [address] [address] [address] [facility] [address] 
[phone] Patient: Dominic Aocsta MRN: [id number] :[id number] Visit Information Date & Time Provider Department Dept. Phone Encounter #  
 3/19/2018 10:45 AM Yumi Pennington NP [facility] [address] [id number] Upcoming Health Maintenance Date Due  
 PAP AKA CERVICAL CYTOLOGY 2020 DTaP/Tdap/Td series (2 - Td) 2027 Allergies as of 3/19/2018  Review Complete On: 3/19/2018 By: Yumi Pennington NP No Known Allergies Current Immunizations  Reviewed on 2017 Name Date Influenza Vaccine (Quad) PF 2017 Tdap 2017 Not reviewed this visit You Were Diagnosed With   
  
 Codes Comments Mixed hyperlipidemia     ICD-10-CM: E78.2 ICD-9-CM: 272.2 Vitals BP Pulse Temp Resp Height(growth percentile) Weight(growth percentile) 139/89 (BP 1 Location: Left arm, BP Patient Position: Sitting) 67 98.3 °F (36.8 °C) 18 5' 7\" (1.702 m) 209 lb (94.8 kg) LMP SpO2 BMI OB Status Smoking Status 2018 (Exact Date) 97% 32.73 kg/m2 Having regular periods Never Smoker Vitals History BMI and BSA Data Body Mass Index Body Surface Area 32.73 kg/m 2 2.12 m 2 Preferred Pharmacy Pharmacy Name Phone [address] [phone] Your Updated Medication List  
  
   
This list is accurate as of 3/19/18 11:02 AM.  Always use your most recent med list.  
  
  
  
  
 atorvastatin 20 mg tablet Commonly known as:  LIPITOR Take 1 Tab by mouth daily. guaiFENesin-codeine 100-10 mg/5 mL solution Commonly known as:  ROBITUSSIN AC Take 5 mL by mouth three (3) times daily as needed for Cough. Max Daily Amount: 15 mL. Do not drive if taking this medication  
  
 naproxen sodium 550 mg tablet Commonly known as:  [place] Take 1 Tab by mouth two (2) times daily (with meals). orphenadrine citrate 100 mg sr tablet Commonly known as:  NORFLEX Take 1 Tab by mouth two (2) times a day. * GURPREET PO Take 1 Tab by mouth daily. * GURPREET 0.15-0.03 mg Tab Generic drug:  levonorgestrel-ethinyl estradiol VITAMIN D3 5,000 unit Tab tablet Generic drug:  cholecalciferol (VITAMIN D3) Take  by mouth daily. * Notice: This list has 2 medication(s) that are the same as other medications prescribed for you. Read the directions carefully, and ask your doctor or other care provider to review them with you. Prescriptions Sent to Pharmacy Refills  
 atorvastatin (LIPITOR) 20 mg tablet 1 Sig: Take 1 Tab by mouth daily. Class: Normal  
 Pharmacy: [address] #: [phone] Route: Oral  
  
To-Do List   
 03/19/2018 Lab:  LIPID PANEL   
  
 03/19/2018 Lab:  METABOLIC PANEL, COMPREHENSIVE [facility] & HEALTH SERVICES! Dear Triny Marquez: Thank you for requesting a [employer] account. Our records indicate that you already have an active [employer] account. You can access your account anytime at [email or web address] Did you know that you can access your hospital and ER discharge instructions at any time in [employer]? You can also review all of your test results from your hospital stay or ER visit. Additional Information If you have questions, please visit the Frequently Asked Questions section of the [employer] website at [email or web address] Remember, [employer] is NOT to be used for urgent needs. For medical emergencies, dial 911. Now available from your iPhone and Android! Please provide this summary of care documentation to your next provider. Your primary care clinician is listed as [address]. If you have any questions after today's visit, please call [phone].

## 2018-03-19 NOTE — PROGRESS NOTES
HISTORY OF PRESENT ILLNESS  Beka Golden is a 43 y.o. female. Patient presents for med refill. HPI  Pt is doing fine. She has no concerns. Review of Systems   Constitutional: Negative. Respiratory: Negative. Cardiovascular: Negative. Visit Vitals    /89 (BP 1 Location: Left arm, BP Patient Position: Sitting)    Pulse 67    Temp 98.3 °F (36.8 °C)    Resp 18    Ht 5' 7\" (1.702 m)    Wt 209 lb (94.8 kg)    LMP 03/01/2018 (Exact Date)    SpO2 97%    BMI 32.73 kg/m2       Physical Exam   Constitutional: She is oriented to person, place, and time. She appears well-developed. No distress. Eyes: Conjunctivae and EOM are normal. Pupils are equal, round, and reactive to light. Neck: Normal range of motion. Neck supple. Cardiovascular: Normal rate, regular rhythm and normal heart sounds. No murmur heard. Pulmonary/Chest: Effort normal and breath sounds normal. No respiratory distress. She has no wheezes. She has no rales. Neurological: She is alert and oriented to person, place, and time. No cranial nerve deficit. ASSESSMENT and PLAN    ICD-10-CM ICD-9-CM    1. Mixed hyperlipidemia E78.2 272.2 atorvastatin (LIPITOR) 20 mg tablet      METABOLIC PANEL, COMPREHENSIVE      LIPID PANEL       PLAN:  Pt was asked to RTC in 6 mos for chronic care. Pt was given after visit summary.

## 2018-05-01 ENCOUNTER — OFFICE VISIT (OUTPATIENT)
Dept: FAMILY MEDICINE CLINIC | Age: 38
End: 2018-05-01

## 2018-05-01 VITALS
HEART RATE: 87 BPM | WEIGHT: 212 LBS | BODY MASS INDEX: 33.27 KG/M2 | DIASTOLIC BLOOD PRESSURE: 83 MMHG | SYSTOLIC BLOOD PRESSURE: 124 MMHG | TEMPERATURE: 98 F | HEIGHT: 67 IN | OXYGEN SATURATION: 99 % | RESPIRATION RATE: 17 BRPM

## 2018-05-01 DIAGNOSIS — R10.11 RUQ ABDOMINAL PAIN: ICD-10-CM

## 2018-05-01 DIAGNOSIS — R07.89 ATYPICAL CHEST PAIN: Primary | ICD-10-CM

## 2018-05-01 NOTE — PROGRESS NOTES
Chief Complaint   Patient presents with    Back Pain     upper back x 4 weeks    Neck Pain     1. Have you been to the ER, urgent care clinic since your last visit? Hospitalized since your last visit? No    2. Have you seen or consulted any other health care providers outside of the [address] [place] [address] since your last visit? Include any pap smears or colon screening.  No

## 2018-05-01 NOTE — PROGRESS NOTES
HISTORY OF PRESENT ILLNESS  Jaden Dejesus is a 43 y.o. female. Patient presents today for back pain that radiates around her chest and her neck is in pain. HPI  Symptoms started about 3 weeks ago. It started as a burn in the center of her back and now wraps around to her chest wall and now radiates to her neck. Motrin does help. Pt is concerned about her heart. Pt stated it did get worse with a meal from [facility] of Systems   Constitutional: Negative. HENT: Negative. Eyes: Negative. Respiratory: Negative. Cardiovascular: Positive for chest pain (chest wall. ). Negative for palpitations. Gastrointestinal: Positive for diarrhea (loser). Negative for nausea and vomiting. Musculoskeletal: Positive for neck pain. Skin: Negative. Visit Vitals    /83 (BP 1 Location: Left arm, BP Patient Position: Sitting)    Pulse 87    Temp 98 °F (36.7 °C) (Oral)    Resp 17    Ht 5' 7\" (1.702 m)    Wt 212 lb (96.2 kg)    LMP 04/26/2018 (Approximate)    SpO2 99%    BMI 33.2 kg/m2       Physical Exam   Constitutional: She appears well-developed. No distress. Neck: Normal range of motion. Neck supple. Cardiovascular: Normal rate, regular rhythm and normal heart sounds. No murmur heard. Pulmonary/Chest: Effort normal and breath sounds normal. No respiratory distress. She has no wheezes. She has no rales. Abdominal: Soft. Bowel sounds are normal. She exhibits no distension and no mass. There is no tenderness. There is no rebound and no guarding. Musculoskeletal: Normal range of motion. She exhibits no edema. ASSESSMENT and PLAN    ICD-10-CM ICD-9-CM    1. Atypical chest pain R07.89 786.59 AMB POC EKG ROUTINE W/ 12 LEADS, INTER & REP   2. RUQ abdominal pain R10.11 789.01 US GALLBLADDER     PLAN:  Pt is aware of normal EKG results. We discussed her concerns and dif dx such as gallbladder. Next step depends on US results. Pt is to call with any concerns.     Pt was given after visit summary.

## 2018-05-01 NOTE — MR AVS SNAPSHOT
[facility] 
 
 
 [address] [address] 
[phone] Patient: Jose Juan Cassidy MRN: [id number] :[id number] Visit Information Date & Time Provider Department Dept. Phone Encounter #  
 2018 11:45 AM Brown Medrano NP [facility] [id number] Upcoming Health Maintenance Date Due Influenza Age 5 to Adult 2018 PAP AKA CERVICAL CYTOLOGY 2020 DTaP/Tdap/Td series (2 - Td) 2027 Allergies as of 2018  Review Complete On: 2018 By: Brown Medrano NP No Known Allergies Current Immunizations  Reviewed on 2017 Name Date Influenza Vaccine (Quad) PF 2017 Tdap 2017 Not reviewed this visit You Were Diagnosed With   
  
 Codes Comments Atypical chest pain    -  Primary ICD-10-CM: R07.89 ICD-9-CM: 786.59   
 RUQ abdominal pain     ICD-10-CM: R10.11 ICD-9-CM: 789.01 Vitals BP Pulse Temp Resp Height(growth percentile) Weight(growth percentile) 124/83 (BP 1 Location: Left arm, BP Patient Position: Sitting) 87 98 °F (36.7 °C) (Oral) 17 5' 7\" (1.702 m) 212 lb (96.2 kg) LMP SpO2 BMI OB Status Smoking Status 2018 (Approximate) 99% 33.2 kg/m2 Having regular periods Never Smoker Vitals History BMI and BSA Data Body Mass Index Body Surface Area  
 33.2 kg/m 2 2.13 m 2 Preferred Pharmacy Pharmacy Name Phone [facility], [address] [place] [address] [phone] Your Updated Medication List  
  
   
This list is accurate as of 18 12:28 PM.  Always use your most recent med list.  
  
  
  
  
 atorvastatin 20 mg tablet Commonly known as:  LIPITOR Take 1 Tab by mouth daily. naproxen sodium 550 mg tablet Commonly known as:  [place] Ander Take 1 Tab by mouth two (2) times daily (with meals). orphenadrine citrate 100 mg sr tablet Commonly known as:  NORFLEX Take 1 Tab by mouth two (2) times a day. * GURPREET PO Take 1 Tab by mouth daily. * GURPREET 0.15-0.03 mg Tab Generic drug:  levonorgestrel-ethinyl estradiol VITAMIN D3 5,000 unit Tab tablet Generic drug:  cholecalciferol (VITAMIN D3) Take  by mouth daily. * Notice: This list has 2 medication(s) that are the same as other medications prescribed for you. Read the directions carefully, and ask your doctor or other care provider to review them with you. We Performed the Following AMB POC EKG ROUTINE W/ 12 LEADS, INTER & REP [21977 CPT(R)] To-Do List   
 05/01/2018 Imaging:  US GALLBLADDER Introducing [facility] & HEALTH SERVICES! Dear Yvette Gill: Thank you for requesting a [employer] account. Our records indicate that you already have an active [employer] account. You can access your account anytime at [email or web address] Did you know that you can access your hospital and ER discharge instructions at any time in [employer]? You can also review all of your test results from your hospital stay or ER visit. Additional Information If you have questions, please visit the Frequently Asked Questions section of the [employer] website at [email or web address] Remember, [employer] is NOT to be used for urgent needs. For medical emergencies, dial 911. Now available from your iPhone and Android! Please provide this summary of care documentation to your next provider. Your primary care clinician is listed as [address]. If you have any questions after today's visit, please call [phone].

## 2018-05-07 ENCOUNTER — HOSPITAL ENCOUNTER (OUTPATIENT)
Dept: ULTRASOUND IMAGING | Age: 38
Discharge: HOME | End: 2018-05-07
Attending: NURSE PRACTITIONER
Payer: COMMERCIAL

## 2018-05-07 DIAGNOSIS — R10.11 RUQ ABDOMINAL PAIN: ICD-10-CM

## 2018-05-07 PROCEDURE — 76705 ECHO EXAM OF ABDOMEN: CPT

## 2018-05-08 ENCOUNTER — TELEPHONE (OUTPATIENT)
Dept: FAMILY MEDICINE CLINIC | Age: 38
End: 2018-05-08

## 2018-05-08 NOTE — TELEPHONE ENCOUNTER
----- Message from Ana Hernandes NP sent at 5/7/2018  9:14 PM EDT -----  Please advise Pt that her US is normal.

## 2018-05-09 DIAGNOSIS — K21.9 GASTROESOPHAGEAL REFLUX DISEASE WITHOUT ESOPHAGITIS: Primary | ICD-10-CM

## 2018-05-09 RX ORDER — PANTOPRAZOLE SODIUM 40 MG/1
40 TABLET, DELAYED RELEASE ORAL DAILY
Qty: 30 TAB | Refills: 5 | Status: SHIPPED | OUTPATIENT
Start: 2018-05-09 | End: 2019-01-07

## 2018-05-10 NOTE — TELEPHONE ENCOUNTER
Left message, per kwan youssef Please let her know I sent this in. Instruct her to take this first thing in the morning on an empty stomach. She can eat in a half an hour later.

## 2018-05-10 NOTE — TELEPHONE ENCOUNTER
Patient returned call to office and was given the message regarding medication. Patient verbalized understanding.

## 2018-09-13 DIAGNOSIS — M62.830 MUSCLE SPASM OF BACK: ICD-10-CM

## 2018-09-13 RX ORDER — ORPHENADRINE CITRATE 100 MG/1
TABLET, EXTENDED RELEASE ORAL
Qty: 30 TAB | Refills: 0 | Status: SHIPPED | OUTPATIENT
Start: 2018-09-13

## 2018-09-13 RX ORDER — NAPROXEN SODIUM 550 MG/1
TABLET ORAL
Qty: 30 TAB | Refills: 0 | Status: SHIPPED | OUTPATIENT
Start: 2018-09-13

## 2018-09-21 ENCOUNTER — OFFICE VISIT (OUTPATIENT)
Dept: FAMILY MEDICINE CLINIC | Age: 38
End: 2018-09-21

## 2018-09-21 ENCOUNTER — HOSPITAL ENCOUNTER (OUTPATIENT)
Dept: LAB | Age: 38
Discharge: HOME | End: 2018-09-21
Payer: COMMERCIAL

## 2018-09-21 VITALS
BODY MASS INDEX: 33.74 KG/M2 | SYSTOLIC BLOOD PRESSURE: 140 MMHG | RESPIRATION RATE: 18 BRPM | TEMPERATURE: 97.8 F | HEIGHT: 67 IN | DIASTOLIC BLOOD PRESSURE: 80 MMHG | WEIGHT: 215 LBS | HEART RATE: 73 BPM | OXYGEN SATURATION: 98 %

## 2018-09-21 DIAGNOSIS — E78.2 MIXED HYPERLIPIDEMIA: ICD-10-CM

## 2018-09-21 LAB
ALBUMIN SERPL-MCNC: 3.7 G/DL (ref 3.4–5)
ALBUMIN/GLOB SERPL: 1.1 {RATIO} (ref 0.8–1.7)
ALP SERPL-CCNC: 56 U/L (ref 45–117)
ALT SERPL-CCNC: 18 U/L (ref 13–56)
ANION GAP SERPL CALC-SCNC: 7 MMOL/L (ref 3–18)
AST SERPL-CCNC: 11 U/L (ref 15–37)
BILIRUB SERPL-MCNC: 0.3 MG/DL (ref 0.2–1)
BUN SERPL-MCNC: 9 MG/DL (ref 7–18)
BUN/CREAT SERPL: 12 (ref 12–20)
CALCIUM SERPL-MCNC: 8.8 MG/DL (ref 8.5–10.1)
CHLORIDE SERPL-SCNC: 104 MMOL/L (ref 100–108)
CHOLEST SERPL-MCNC: 162 MG/DL
CO2 SERPL-SCNC: 27 MMOL/L (ref 21–32)
CREAT SERPL-MCNC: 0.76 MG/DL (ref 0.6–1.3)
GLOBULIN SER CALC-MCNC: 3.3 G/DL (ref 2–4)
GLUCOSE SERPL-MCNC: 82 MG/DL (ref 74–99)
HDLC SERPL-MCNC: 50 MG/DL (ref 40–60)
HDLC SERPL: 3.2 {RATIO} (ref 0–5)
LDLC SERPL CALC-MCNC: 93.2 MG/DL (ref 0–100)
LIPID PROFILE,FLP: NORMAL
POTASSIUM SERPL-SCNC: 4.4 MMOL/L (ref 3.5–5.5)
PROT SERPL-MCNC: 7 G/DL (ref 6.4–8.2)
SODIUM SERPL-SCNC: 138 MMOL/L (ref 136–145)
TRIGL SERPL-MCNC: 94 MG/DL (ref ?–150)
VLDLC SERPL CALC-MCNC: 18.8 MG/DL

## 2018-09-21 PROCEDURE — 80061 LIPID PANEL: CPT | Performed by: NURSE PRACTITIONER

## 2018-09-21 PROCEDURE — 36415 COLL VENOUS BLD VENIPUNCTURE: CPT | Performed by: NURSE PRACTITIONER

## 2018-09-21 PROCEDURE — 80053 COMPREHEN METABOLIC PANEL: CPT | Performed by: NURSE PRACTITIONER

## 2018-09-21 RX ORDER — ATORVASTATIN CALCIUM 20 MG/1
TABLET, FILM COATED ORAL
Qty: 90 TAB | Refills: 1 | Status: SHIPPED | OUTPATIENT
Start: 2018-09-21 | End: 2019-04-05 | Stop reason: SDUPTHER

## 2018-09-21 NOTE — PROGRESS NOTES
HISTORY OF PRESENT ILLNESS  Karen Contreras is a 43 y.o. female. Patient presents for chronic care. Chief Complaint   Patient presents with    Cholesterol Problem       HPI  Pt needs refills on her cholesterol medications. Se is doing fine and has no other concerns. Review of Systems   Constitutional: Negative. Respiratory: Negative. Cardiovascular: Negative. Musculoskeletal: Negative. Visit Vitals    /80 (BP 1 Location: Left arm, BP Patient Position: Sitting)    Pulse 73    Temp 97.8 °F (36.6 °C) (Oral)    Resp 18    Ht 5' 7\" (1.702 m)    Wt 215 lb (97.5 kg)    LMP 09/11/2018    SpO2 98%    BMI 33.67 kg/m2     Physical Exam   Constitutional: She appears well-developed. No distress. Cardiovascular: Normal rate, regular rhythm and normal heart sounds. No murmur heard. Pulmonary/Chest: Effort normal and breath sounds normal. No respiratory distress. She has no wheezes. She has no rales. Musculoskeletal: She exhibits no edema. ASSESSMENT and PLAN    ICD-10-CM ICD-9-CM    1. Mixed hyperlipidemia E78.2 272.2 atorvastatin (LIPITOR) 20 mg tablet      METABOLIC PANEL, COMPREHENSIVE      LIPID PANEL     PLAN:  Pt is to continue working on diet and exercise.   Pt was asked to RTC in 6 months for chronic care    Pt given after visit summary

## 2018-09-21 NOTE — MR AVS SNAPSHOT
[address] [facility], [place] 
 
 
 [address] [facility] [address] [address] 
[phone] Patient: Brenda Carreon MRN: [id number] :1980 Visit Information Date & Time Provider Department Dept. Phone Encounter #  
 2018  9:40 AM Jose Peguero NP [facility] [id number] Follow-up Instructions Return in about 6 months (around 3/21/2019) for chronic care. Upcoming Health Maintenance Date Due Influenza Age 5 to Adult 2018 PAP AKA CERVICAL CYTOLOGY 2021 DTaP/Tdap/Td series (2 - Td) 2027 Allergies as of 2018  Review Complete On: 2018 By: Jose Peguero NP No Known Allergies Current Immunizations  Reviewed on 2017 Name Date Influenza Vaccine (Quad) PF 2017 Tdap 2017 Not reviewed this visit You Were Diagnosed With   
  
 Codes Comments Mixed hyperlipidemia     ICD-10-CM: E78.2 ICD-9-CM: 272.2 Vitals BP Pulse Temp Resp Height(growth percentile) Weight(growth percentile) 140/80 (BP 1 Location: Left arm, BP Patient Position: Sitting) 73 97.8 °F (36.6 °C) (Oral) 18 5' 7\" (1.702 m) 215 lb (97.5 kg) LMP SpO2 BMI OB Status Smoking Status 2018 98% 33.67 kg/m2 Having regular periods Never Smoker BMI and BSA Data Body Mass Index Body Surface Area  
 33.67 kg/m 2 2.15 m 2 Preferred Pharmacy Pharmacy Name Phone [address] [phone] Your Updated Medication List  
  
   
This list is accurate as of 18 10:06 AM.  Always use your most recent med list.  
  
  
  
  
 atorvastatin 20 mg tablet Commonly known as:  LIPITOR  
TAKE 1 TABLET BY MOUTH EVERY DAY  
  
 naproxen sodium 550 mg tablet Commonly known as:  Thermon Bathe TAKE 1 TAB BY MOUTH TWO (2) TIMES DAILY (WITH MEALS). orphenadrine citrate 100 mg sr tablet Commonly known as:  NORFLEX TAKE 1 TABLET BY MOUTH TWICE A DAY pantoprazole 40 mg tablet Commonly known as:  PROTONIX Take 1 Tab by mouth daily. GURPREET PO Take 1 Tab by mouth daily. VITAMIN D3 5,000 unit Tab tablet Generic drug:  cholecalciferol (VITAMIN D3) Take  by mouth daily. Prescriptions Sent to Pharmacy Refills  
 atorvastatin (LIPITOR) 20 mg tablet 1 Sig: TAKE 1 TABLET BY MOUTH EVERY DAY Class: Normal  
 Pharmacy: [facility], [facility] #: [phone] Follow-up Instructions Return in about 6 months (around 3/21/2019) for chronic care. To-Do List   
 09/21/2018 Lab:  LIPID PANEL   
  
 09/21/2018 Lab:  METABOLIC PANEL, COMPREHENSIVE [facility]! Dear Bea Carrillo: Thank you for requesting a ownCloud account. Our records indicate that you already have an active ownCloud account. You can access your account anytime at [email or web address] Did you know that you can access your hospital and ER discharge instructions at any time in ownCloud? You can also review all of your test results from your hospital stay or ER visit. Additional Information If you have questions, please visit the Frequently Asked Questions section of the ownCloud website at [email or web address] Remember, ownCloud is NOT to be used for urgent needs. For medical emergencies, dial 911. Now available from your iPhone and Android! Please provide this summary of care documentation to your next provider. Your primary care clinician is listed as [address]. If you have any questions after today's visit, please call [phone].

## 2018-09-21 NOTE — PROGRESS NOTES
1. Have you been to the ER, urgent care clinic since your last visit? Hospitalized since your last visit? no    2. Have you seen or consulted any other health care providers outside of the [address] [place] [address] since your last visit? Include any pap smears or colon screening.   Had pap at GYN in July    Needs refills on Lipitor

## 2018-09-22 NOTE — PROGRESS NOTES
Please advise Pt that her kidney and liver functions are fine and her potassium levels are normal. 
Her cholesterol numbrs are good.

## 2018-12-05 DIAGNOSIS — M62.830 MUSCLE SPASM OF BACK: ICD-10-CM

## 2018-12-06 RX ORDER — ORPHENADRINE CITRATE 100 MG/1
TABLET, EXTENDED RELEASE ORAL
Qty: 30 TAB | Refills: 0 | OUTPATIENT
Start: 2018-12-06

## 2018-12-06 RX ORDER — NAPROXEN SODIUM 550 MG/1
TABLET ORAL
Qty: 30 TAB | Refills: 0 | OUTPATIENT
Start: 2018-12-06

## 2019-01-07 ENCOUNTER — HOSPITAL ENCOUNTER (OUTPATIENT)
Dept: LAB | Age: 39
Discharge: HOME | End: 2019-01-07
Payer: COMMERCIAL

## 2019-01-07 ENCOUNTER — OFFICE VISIT (OUTPATIENT)
Dept: FAMILY MEDICINE CLINIC | Age: 39
End: 2019-01-07

## 2019-01-07 VITALS
HEART RATE: 78 BPM | BODY MASS INDEX: 33.65 KG/M2 | SYSTOLIC BLOOD PRESSURE: 126 MMHG | OXYGEN SATURATION: 99 % | RESPIRATION RATE: 18 BRPM | DIASTOLIC BLOOD PRESSURE: 84 MMHG | TEMPERATURE: 97.8 F | WEIGHT: 214.4 LBS | HEIGHT: 67 IN

## 2019-01-07 DIAGNOSIS — R20.2 PARESTHESIA OF BOTH HANDS: ICD-10-CM

## 2019-01-07 DIAGNOSIS — R20.2 PARESTHESIA OF BOTH FEET: Primary | ICD-10-CM

## 2019-01-07 DIAGNOSIS — R20.2 PARESTHESIA OF BOTH FEET: ICD-10-CM

## 2019-01-07 LAB
BASOPHILS # BLD: 0 K/UL (ref 0–0.1)
BASOPHILS NFR BLD: 1 % (ref 0–2)
DIFFERENTIAL METHOD BLD: ABNORMAL
EOSINOPHIL # BLD: 0.1 K/UL (ref 0–0.4)
EOSINOPHIL NFR BLD: 2 % (ref 0–5)
ERYTHROCYTE [DISTWIDTH] IN BLOOD BY AUTOMATED COUNT: 12.4 % (ref 11.6–14.5)
GLUCOSE SERPL-MCNC: 89 MG/DL (ref 74–99)
HCT VFR BLD AUTO: 39.3 % (ref 35–45)
HGB BLD-MCNC: 12.9 G/DL (ref 12–16)
LYMPHOCYTES # BLD: 1.7 K/UL (ref 0.9–3.6)
LYMPHOCYTES NFR BLD: 45 % (ref 21–52)
MCH RBC QN AUTO: 28.2 PG (ref 24–34)
MCHC RBC AUTO-ENTMCNC: 32.8 G/DL (ref 31–37)
MCV RBC AUTO: 85.8 FL (ref 74–97)
MONOCYTES # BLD: 0.4 K/UL (ref 0.05–1.2)
MONOCYTES NFR BLD: 11 % (ref 3–10)
NEUTS SEG # BLD: 1.5 K/UL (ref 1.8–8)
NEUTS SEG NFR BLD: 41 % (ref 40–73)
PLATELET # BLD AUTO: 429 K/UL (ref 135–420)
PMV BLD AUTO: 10.4 FL (ref 9.2–11.8)
RBC # BLD AUTO: 4.58 M/UL (ref 4.2–5.3)
VIT B12 SERPL-MCNC: 286 PG/ML (ref 211–911)
WBC # BLD AUTO: 3.8 K/UL (ref 4.6–13.2)

## 2019-01-07 PROCEDURE — 36415 COLL VENOUS BLD VENIPUNCTURE: CPT

## 2019-01-07 PROCEDURE — 82947 ASSAY GLUCOSE BLOOD QUANT: CPT

## 2019-01-07 PROCEDURE — 82607 VITAMIN B-12: CPT

## 2019-01-07 PROCEDURE — 85025 COMPLETE CBC W/AUTO DIFF WBC: CPT
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
## 2019-01-07 NOTE — PATIENT INSTRUCTIONS
Numbness and Tingling: Care Instructions  Your Care Instructions    Many things can cause numbness or tingling. Swelling may put pressure on a nerve. This could cause you to lose feeling or have a pins-and-needles sensation on part of your body. Nerves may be damaged from trauma, toxins, or diseases, such as diabetes or multiple sclerosis (MS). Sometimes, though, the cause is not clear. If there is no clear reason for your symptoms, and you are not having any other symptoms, your doctor may suggest watching and waiting for a while to see if the numbness or tingling goes away on its own. Your doctor may want you to have blood or nerve tests to find the cause of your symptoms. Follow-up care is a key part of your treatment and safety. Be sure to make and go to all appointments, and call your doctor if you are having problems. It's also a good idea to know your test results and keep a list of the medicines you take. How can you care for yourself at home? · If your doctor prescribes medicine, take it exactly as directed. Call your doctor if you think you are having a problem with your medicine. · If you have any swelling, put ice or a cold pack on the area for 10 to 20 minutes at a time. Put a thin cloth between the ice and your skin. When should you call for help? Call 911 anytime you think you may need emergency care. For example, call if:    · You have weakness, numbness, or tingling in both legs.     · You lose bowel or bladder control.     · You have symptoms of a stroke. These may include:  ? Sudden numbness, tingling, weakness, or loss of movement in your face, arm, or leg, especially on only one side of your body. ? Sudden vision changes. ? Sudden trouble speaking. ? Sudden confusion or trouble understanding simple statements. ? Sudden problems with walking or balance.   ? A sudden, severe headache that is different from past headaches.    Watch closely for changes in your health, and be sure to contact your doctor if you have any problems, or if:    · You do not get better as expected. Where can you learn more? Go to http://genaro-renu.info/. Enter V247 in the search box to learn more about \"Numbness and Tingling: Care Instructions. \"  Current as of: September 10, 2017  Content Version: 11.8  © 5948-8851 [employer]. Care instructions adapted under license by [employer] (which disclaims liability or warranty for this information). If you have questions about a medical condition or this instruction, always ask your healthcare professional. [place] [address] any warranty or liability for your use of this information.

## 2019-02-05 ENCOUNTER — OFFICE VISIT (OUTPATIENT)
Dept: FAMILY MEDICINE CLINIC | Age: 39
End: 2019-02-05

## 2019-02-05 ENCOUNTER — HOSPITAL ENCOUNTER (OUTPATIENT)
Dept: GENERAL RADIOLOGY | Age: 39
Discharge: HOME | End: 2019-02-05
Payer: COMMERCIAL

## 2019-02-05 VITALS
WEIGHT: 213 LBS | OXYGEN SATURATION: 99 % | SYSTOLIC BLOOD PRESSURE: 138 MMHG | HEIGHT: 67 IN | RESPIRATION RATE: 16 BRPM | BODY MASS INDEX: 33.43 KG/M2 | DIASTOLIC BLOOD PRESSURE: 87 MMHG | HEART RATE: 75 BPM | TEMPERATURE: 98 F

## 2019-02-05 DIAGNOSIS — M54.12 CERVICAL RADICULOPATHY: ICD-10-CM

## 2019-02-05 DIAGNOSIS — M54.12 CERVICAL RADICULOPATHY: Primary | ICD-10-CM

## 2019-02-05 PROCEDURE — 72040 X-RAY EXAM NECK SPINE 2-3 VW: CPT

## 2019-02-05 RX ORDER — LEVONORGESTREL AND ETHINYL ESTRADIOL 0.15-0.03
KIT ORAL
COMMUNITY
Start: 2019-02-01

## 2019-02-05 NOTE — PROGRESS NOTES
Chief Complaint   Patient presents with    Neck Pain    Back Pain     1. Have you been to the ER, urgent care clinic since your last visit? Hospitalized since your last visit? No    2. Have you seen or consulted any other health care providers outside of the [address] [place] [address] since your last visit? Include any pap smears or colon screening.  No

## 2019-02-05 NOTE — PROGRESS NOTES
HISTORY OF PRESENT ILLNESS  Sonia Spears is a 43 y.o. female. Patient presents with neck and back pain. HPI  Saw Dr Teja Gonzalez on 1-7-19 and labs were ordered. Pt states the numbness and tingling have improved since seen. She has no feet symptoms. She had a sharp neck pain episode that lasted a few minutes. It is gone. Review of Systems   Constitutional: Negative. Respiratory: Negative. Cardiovascular: Negative. Musculoskeletal: Positive for neck pain. Neurological: Positive for tingling (improved). Visit Vitals  /87 (BP 1 Location: Left arm, BP Patient Position: Sitting)   Pulse 75   Temp 98 °F (36.7 °C) (Oral)   Resp 16   Ht 5' 7\" (1.702 m)   Wt 213 lb (96.6 kg)   LMP 01/03/2019 (Exact Date)   SpO2 99%   BMI 33.36 kg/m²     Physical Exam   Constitutional: She is oriented to person, place, and time. She appears well-developed. No distress. Eyes: Conjunctivae and EOM are normal. Pupils are equal, round, and reactive to light. Neck: Normal range of motion. Neck supple. Cardiovascular: Normal rate, regular rhythm and normal heart sounds. No murmur heard. Pulmonary/Chest: Effort normal. No respiratory distress. She has no wheezes. She has rales. Musculoskeletal: Normal range of motion. Neurological: She is alert and oriented to person, place, and time. ASSESSMENT and PLAN    ICD-10-CM ICD-9-CM    1. Cervical radiculopathy M54.12 723.4 XR SPINE CERV PA LAT ODONT 3 V MAX     PLAN:  Next step is pending x-ray results. Further work up may not be needed since her symptoms are improving. I have discussed the diagnosis with the patient and the intended plan as seen in the above orders. The patient has received an after-visit summary and questions were answered concerning future plans. I have discussed medication side effects and warnings with the patient as well. Patient will call for further questions.     Follow-up Disposition:  Return if symptoms worsen or fail to improve.

## 2019-02-06 NOTE — PROGRESS NOTES
Please advise Pt that her x-ray shows some bone spurs this can cause her symptoms.  Next step is ref to ortho for consultation

## 2019-02-12 ENCOUNTER — PATIENT MESSAGE (OUTPATIENT)
Dept: FAMILY MEDICINE CLINIC | Age: 39
End: 2019-02-12

## 2019-07-10 DIAGNOSIS — E78.2 MIXED HYPERLIPIDEMIA: ICD-10-CM

## 2019-07-11 ENCOUNTER — TELEPHONE (OUTPATIENT)
Dept: FAMILY MEDICINE CLINIC | Age: 39
End: 2019-07-11

## 2019-07-11 DIAGNOSIS — E78.2 MIXED HYPERLIPIDEMIA: ICD-10-CM

## 2019-07-11 NOTE — TELEPHONE ENCOUNTER
Pt is requesting the lipitor she don't have any pills left can someone refill please advise [phone]

## 2019-07-12 RX ORDER — ATORVASTATIN CALCIUM 20 MG/1
TABLET, FILM COATED ORAL
Qty: 90 TAB | Refills: 0 | Status: SHIPPED | OUTPATIENT
Start: 2019-07-12 | End: 2019-07-12 | Stop reason: SDUPTHER

## 2019-07-12 RX ORDER — ATORVASTATIN CALCIUM 20 MG/1
TABLET, FILM COATED ORAL
Qty: 90 TAB | Refills: 0 | Status: SHIPPED | OUTPATIENT
Start: 2019-07-12

## 2019-07-12 NOTE — TELEPHONE ENCOUNTER
Medication refill per verbal order [facility] NP  Needs follow up appointment scheduled.

## 2019-07-14 RX ORDER — ATORVASTATIN CALCIUM 20 MG/1
20 TABLET, FILM COATED ORAL DAILY
Qty: 90 TAB | Refills: 3 | Status: SHIPPED | OUTPATIENT
Start: 2019-07-14 | End: 2019-10-02 | Stop reason: SDUPTHER

## 2019-10-02 DIAGNOSIS — E78.2 MIXED HYPERLIPIDEMIA: ICD-10-CM

## 2019-10-03 RX ORDER — ATORVASTATIN CALCIUM 20 MG/1
TABLET, FILM COATED ORAL
Qty: 90 TAB | Refills: 0 | Status: SHIPPED | OUTPATIENT
Start: 2019-10-03

## 2023-02-08 NOTE — TELEPHONE ENCOUNTER
Spoke with patient informing her that Vitamin D was very elevated and to stop taking any supplements. Patient verbally understood. 08-Feb-2023 07:16

## 2023-05-25 RX ORDER — NAPROXEN SODIUM 550 MG/1
TABLET ORAL
COMMUNITY
Start: 2018-09-13

## 2023-05-25 RX ORDER — ORPHENADRINE CITRATE 100 MG/1
1 TABLET, EXTENDED RELEASE ORAL 2 TIMES DAILY
COMMUNITY
Start: 2018-09-13

## 2023-05-25 RX ORDER — LEVONORGESTREL AND ETHINYL ESTRADIOL 0.15-0.03
KIT ORAL
COMMUNITY
Start: 2019-02-01

## 2023-05-25 RX ORDER — ATORVASTATIN CALCIUM 20 MG/1
1 TABLET, FILM COATED ORAL DAILY
COMMUNITY
Start: 2019-07-12